# Patient Record
(demographics unavailable — no encounter records)

---

## 2018-06-23 NOTE — NUR
31Y/F CAME TO ER WITH  AND SON, C/O LOWER BACK PAIN/ABD. CRAMPING X1WK, 
DENIES PAINFUL URINATION, DENIES FALL. 18 WKS. GEST. , AB 1. SKIN IS 
PINK/WARM/DRY; AAOX4 WITH EVEN AND STEADY GAIT;  PATIENT STATES PAIN OF 5/10 AT 
THIS TIME; VSS; PATIENT POSITIONED FOR COMFORT; HOB ELEVATED; BEDRAILS UP X1; 
BED DOWN. ER MD MADE AWARE OF PT STATUS.

## 2018-09-28 NOTE — NUR
31/F BIB  FOR MED REFILL &  C/O L ARM WOUND & PAIN.  PT DC FROM [facility] 
YESTERDAY S/P SEPSIS & UTI. DENIES N/V/D;  AAOX4 WITH EVEN AND STEADY GAIT; 
LUNGS CLEAR BL. PT DENIES ANY FEVER, CP, SOB, OR COUGH AT THIS TIME; PATIENT 
STATES PAIN OF 10/10 AT THIS TIME. PATIENT POSITIONED FOR COMFORT; HOB 
ELEVATED; BEDRAILS UP X2; BED DOWN. ER MD MADE AWARE OF PT STATUS.

## 2018-09-28 NOTE — NUR
Patient discharged with v/s stable. Written and verbal after care instructions 
given and explained. 

Patient alert, oriented and verbalized understanding of instructions. 
Ambulatory with steady gait. All questions addressed prior to discharge. ID 
band removed. Patient advised to follow up with PMD. Rx of ASPIRIN, METOPROLOL, 
NORCO, AMLODIPINE, SERTYRALINE & AUGMENTINE given. Patient educated on 
indication of medication including possible reaction and side effects. 
Opportunity to ask questions provided and answered.

## 2018-09-30 NOTE — NUR
PT C/O SOB SINCE LAST NIGHT WITH ACCESSORY MUSCLE USE AND DIMINISHED BS; 
MULTIPLE ADMIT THE FOR THIS MONTH. PT STATES SHE JUST NEEDS OXYGEN. DIMINISHED 
LUNG SOUNDS; NO AIR FLOW AUSCULTATED. O2 VIA NASAL CANULA APPLIED. PATIENT 
STATES PAIN OF 0/10 AT THIS TIME; PATIENT POSITIONED FOR COMFORT; HOB ELEVATED; 
BEDRAILS UP X2; BED DOWN. ER MD MADE AWARE OF PT STATUS.

## 2018-09-30 NOTE — NUR
Patient appears in distress, assessment by RN completed. ED md made aware of pt 
status. ED md advised he will see her "when she is in the system."

## 2018-09-30 NOTE — NUR
CALLED TO ER FOR TX ON PATIENT WITH HISTORY OF ASTHMA. NO TX ORDERED YET. PATIENT ASSESSED: 
O2 SATS 100% ON 2L NASAL CANULA, RESPIRATIONS 28, PULSE 111. BREATH SOUNDS DIMINISHED ON 
BILATERAL ANTERIOR UPPER LOBES. PATIENT VERBALLY STATED SHE DOES NOT WANT TREATMENTS.

## 2018-09-30 NOTE — NUR
CHARLOTTE, RN PLACED PATIENT ON 3L NASAL CANNULA SINCE  AND SON AT BEDSIDE. PATIENT AWAKE 
ALERT AND ORIENTED. HR-88, SAO2-100% RR-20BPM. NO SIGNS OF RESPIRATORY DISTRESS

## 2018-09-30 NOTE — NUR
CALLED DR. CRAWFORD, CONFIRMED PT IS NPO STATUS EXCEPT MEDS DUE TO POSSIBILITY OF PANCREATITIS. 
WILL CARRY OUT.

## 2018-09-30 NOTE — NUR
called to icu 1 to place  pt on bipap due to respiratory distress, settings 12\6 rr 16 fio2 
30%, alarms on and audible ambu bag at side of bipap, b\s are coarse bilaterally,pt wearing 
large face mask with protetic gel in place. dr. juarez will order breathing tx and abg in 
one hour on the settings stated above.

## 2018-09-30 NOTE — NUR
PT TO CT SCAN OF CHEST ON BIPAP WITH SAME SETTINGS, THEN BACK TO ICU I STILL ON BIPAP PT 
SLEEPING DURING PROCEDURE  RN EIRENE AT BEDSIDE WITH PT

## 2018-09-30 NOTE — NUR
PATIENT IS COUGHING UP COPIOUS AMOUNTS OF BLOOD TINGED SPUTUM. PATIENT REPORTS 
IT STARTED LAST NIGHT.

## 2018-09-30 NOTE — NUR
RECEIVED BEDSIDE REPORT FROM MORNING SHIFT RN, FREDA, FOR CONTINUITY OF CARE. PT IS ASLEEP 
AT THIS TIME ON BIPAP.AFEBRILE (97.3), VSS, LUNG SOUNDS ARE CLEAR/DIMINISHED IN UPPER 
BILATERAL BASES, DIMINISHED ON LOWER BILATERAL BASES. SR ON CARDIAC MONITOR. TIMMONS IN 
INTACT, PATENT URINE IS CLEAR/YELLOW. BOWEL SOUNDS ACTIVE ON AUSCULATION. SKIN IS 
NON-INTACT, REDNESS AND STAPLES NOTED ON SITE OF , WOUND DRESSING DRY& INACT ON 
RIGHT UPPER ARM. NON-PITTING EDEMA ON BILATERAL FEET, PALM OF FEET DRY/ROUGH TO TOUCH.HOB 
ELEVATED ABOVE 30 DEG. RIGHT IJ TRIPLE LUMEN CENTRAL LINE IN PLACE, INFUSING HEPARIN AT 1100 
UNITS/HR AND NS AT 10ML/HR INFUSING.

## 2018-09-30 NOTE — NUR
PATIENT OFF UNIT TO GO TO RADIOLOGY FOR CT SCAN, PATIENT HOOKED UP TO TRANSPORT MONITOR AND 
BIPAP, VS STABLE AT THIS TIME, ACCOMPANIED BY 2 RTS. NO SIGNS OF DISTRESS NOTED. WILL 
CONTINUE TO MONITOR

## 2018-09-30 NOTE — NUR
PATIENT BACK IN UNIT, NO SIGNS DISTRESS DURING PROCEDURE, PATIENT TOLERATED WELL. PATIENT 
BACK ON CARDIAC MONITOR, BIPAP IN PLACE, VITAL SIGNS STABLE, NO SIGNS OF DISTRESS NOTED. 
WILL CONTINUE TO MONITOR

## 2018-09-30 NOTE — NUR
RECEIVED BEDSIDE REPORT FROM ER RN FOR CONTINUITY OF CARE. PATIENT IS AAOX4, ABLE TO FOLLOW 
COMMANDS AND MAKE NEEDS KNOWN. PATIENT IS ON NONREBREATHER MASK, BREATHING IS LABORED AND 
TACHYPNEIC, ST ON MONITOR, DENIES ANY PAIN. PATIENT SKIN IS NOT INTACT, SHE HAS WOUND TO 
RIGHT UPPER ARM AND LOWER ABDOMINAL S/P INCISION. PATIENT HAS TIMMONS CATHETER IN PLACE TO 
CLEAR YELLOW URINE. HOB IS 60 DEGREES IN LOW POSITION. ALL SAFETY PRECAUTIONS IN PLACE, CALL 
LIGHT WITHIN REACH. WILL CONTINUE TO MONITOR

## 2018-09-30 NOTE — NUR
RECEIVED CALL FROM DR. BARRIOS, UPDATED ON PATIENT'S CONDITION. RECEIVED ORDER FOR LASIX 20MG 
IVP NOW AND FOR TOMORROW MORNING

## 2018-09-30 NOTE — NUR
DR. ONTIVEROS IN TO SEE AND EXAMINE PATIENT, UPDATED ON PATIENT'S CONDITION. DISCUSSED PLAN OF 
CARE FOR PATIENT WITH DR. OSPINA, WILL FOLLOW UP ON ANY ORDERS.

## 2018-09-30 NOTE — NUR
Patient will be admitted to care of DR. WU. Admited to ICU.  Will go to room 
1. Belongings list completed.  Report to MANDY WEST.

## 2018-10-01 NOTE — NUR
ECHO TECH AT BEDSIDE FOR ECHOCARDIOGRAM, PATIENT TOLERATING WELL, NO SIGNS OF ACUTE DISTRESS 
NOTED.

## 2018-10-01 NOTE — NUR
PT. STILL SLEEPING BUT WOKE UP EASILY  FOR BREATHING TREATMENT AND LOVENOX SQ 
ADMINISTRATION. TOLERATED WELL. ABLE TO VERBALIZE NEEDS WELL IN Ghanaian. PT. ENCOURAGED TO 
CALL FOR ANY HELP SHE MAY NEED. BED ALARM ON.

## 2018-10-01 NOTE — NUR
PATIENT HAS BEEN SCREENED AND CATEGORIZED AS HIGH NUTRITION RISK. PATIENT WILL BE SEEN 
WITHIN 1-2 DAYS OF ADMISSION.



10/01/18



TONE ARENAS RD

## 2018-10-01 NOTE — NUR
RECEIVED FROM AM RN SITTING IN CHAIR WITH BREATHING TREATMENT ON GOING. ABLE TO VERBALIZE 
NEEDS WELL. NO SOB COMPLAINTS AT THIS TIME. PT. ABLE TO VERBALIZE NEEDS WELL. ROM X 4. CLEAR 
SPEECH. SPEAKS Trinidadian AND UNDERSTANDS A LITTLE BIT OF ENGLISH. TRANSFER FROM ICU WITH DX. 
OF PULMONARY EMBOLISM RT COMPLAINT OF SOB. CT SCAN  ABDOMEN SHOWS  NO ANEURYSM , ALL IMAGING 
TESTS DONE SHOWS NO EVIDENCE OF THROMBUS. TELEMETRY MONITORING.

## 2018-10-01 NOTE — NUR
ADMINISTERED PRN NORCO PO FOR 6/10 PAIN TO HER RIGHT UPPER ARM. PATIENT TOLERATED WELL, WILL 
REASSESS PAIN IN 45 MINS.

## 2018-10-01 NOTE — NUR
PT TRANSFERRED FROM ICU. PT IS AAOX4, AMBULATORY, PT HAS IJ X 3 LUMEN, PT HAS RIGHT UPPER 
ARM WOUND S/P I & D,  STITCHES, PT IS ON O2 3L NC, NO S/S OF RESPIRATORY DISTRESS 
OR DISCOMFORT NOTED, DISCUSSED PLAN OF CARE WITH PT, PT VERBALIZED UNDERSTANDING, ORIENTED 
PT TO ROOM, CALL LIGHT IS WITHIN REACH, WILL CONTINUE TO MONITOR.

## 2018-10-01 NOTE — NUR
WOUND CARE RN, HUMBLE, AT BEDSIDE TO EVALUATE WOUND AND CHANGE DRESSING. PATIENT TOLERATES 
WELL, STATES THAT THERE IS NO PAIN, BUT IT IS ITCHY. PATIENT WAS EDUCATED ON WOUND CARE FOR 
HER RIGHT UPPER ARM, PATIENT VERBALIZES UNDERSTANDING. PATIENT IS ALSO AWARE THAT THE 
SURGEON WILL CHECK ON THE ABDOMINAL INCISION TO REMOVE STAPLES.

## 2018-10-01 NOTE — NUR
TRANSFERRED PATIENT TO [facility], PATIENT WAS HOOKED TO TRANSPORT MONITOR, VITAL SIGNS STABLE, 
NASAL CANNULA AT 3LPM, NO SIGNS OF SOB NOTED. ENDORSED CONTINUITY OF CARE TO [facility] RN. NO 
SIGNS OF DISTRESS AT THIS TIME.

## 2018-10-01 NOTE — NUR
PATIENT BACK TO UNIT. PATIENT TOLERATED CT SCAN WELL, NO ACUTE DISTRESS NOTED. PATIENT IS 
BACK ON CARDIAC MONITOR, VITAL SIGNS STABLE.

## 2018-10-01 NOTE — NUR
MD FONTANEZ IN HERE TO CHECK ON PT'S . RIGHT WOUND TO AC . DRESSING CHANGED . STILL WITH 
SLIGHT SANGUINOUS DISCHARGE FROM SITE NOTED. PT. SPOUSE AND TODDLER SON IN HERE VISITING . 
ADVISED FATHER TO KEEP AN EYE ON SON AND NOT TO LET HIM GO ON TOP OF BED RT POSSIBLE INJURY 
OR FALL. "OK" PER SPOUSE AND PT. CARE PLANS FOR THE NIGHT DISCUSSED WITH PT. AND CALL LIGHT 
WITH IN REACH AT ALL TIMES. PT. USES CALL LIGHT FOR HELP AT ALL TIMES. A/O X 4. ROM X 4. PT. 
ABLE TO BEND RIGHT HAND FROM ELBOW WELL AS REQUESTED BY MD. FONTANEZ.

## 2018-10-01 NOTE — NUR
PT IS AWAKE AND ALERT, ON 3L NASAL CANNULA SATING %. RT AT BEDSIDE TO PROVIDE 
BREATHING TREATMENT. BED BATH, ORAL CARE, AND CATHETER CARE PROVIDED AT BEDSIDE. URINE 
OUTPUT  Q SHIFT. FRESH GOWN PROVIDED. 

-------------------------------------------------------------------------------

Addendum: 10/01/18 at 0701 by Yessi Waller RN

-------------------------------------------------------------------------------

URINE OUTPUT 500ML Q SHIFT

## 2018-10-01 NOTE — NUR
PATIENT WANTED OFF BIPAP AND PLACED ON NASAL CANNULA 2LPM BY MANDY COOK. NO DISTRESS NOTED 
SAO2-98% RR-18 HR-95BPM

## 2018-10-01 NOTE — NUR
DR. MYERS IN TO SEE AND EXAMINE PATIENT, UPDATED ON PATIENT'S CONDITION. WILL FOLLOW UP ON 
ANY ORDERS.

## 2018-10-01 NOTE — NUR
10/1/18 RD INITIAL ASSESSMENT COMPLETED



PLEASE REFER TO NUTRITION ASSESSMENT UNDER CARE ACTIVITY FOR ESTIMATED NUTRITIONAL NEEDS. 



1. CONTINUE CARDIAC DIET AS TOLERATED 

2. RECOMMENDED NADIYA BID

3. RD TO FOLLOW-UP 5-7 DAYS, LOW RISK 



TONE ARENAS RD

## 2018-10-01 NOTE — NUR
PT. FAMILY MEMBER LEFT FOR HOME. SEEN PT. WALK INSIDE ROOM WELL WITH OUT ANY ASSIST. ROM X 
4. CLEAR SPEECH. CALL LIGHT WITH IN REACH.

## 2018-10-01 NOTE — NUR
PATIENT'S O2 SATURATION 97% ON RA. LEFT CANULA ON 1LPM AND WITHIN REACH BUT OFF PATIENT. 
WILL CONTINUE TO MONITOR.

## 2018-10-01 NOTE — NUR
RECEIVED BEDSIDE REPORT FOR CONTINUITY OF CARE FROM NIGHT SHIFT RNRENEE. PATIENT IS AAOX4, 
ABLE TO FOLLOW COMMANDS AND MAKE NEEDS KNOWN. SKIN NOT INTACT, WARM AND DRY, HAS WOUND TO 
RIGHT UPPER ARM, DRESSING CLEAN AND DRY. PATIENT HAS S/P INCISION POST CESARIAN, NO DRAINAGE 
NOTED. PATIENT HAS CENTRAL LINE TO RIGHT IJ, ASYMPTOMATIC, INTACT, PATENT, DRESSING CLEAN 
AND DRY. PATIENT IS ON NASAL CANNULA AT 3LPM, BREATHING UNLABORED AND EVEN, ST ON CARDIAC 
MONITOR, DENIES ANY PAIN, SOB. PATIENT HAS TIMMONS CATHETER IN PLACE TO YELLOW URINE. HOB IS 
IN SEMI-FOWLERS POSITION, CALL LIGHT WITHIN REACH. ALL SAFETY PRECAUTIONS ASSESSED AND 
ENFORCED. NO SIGNS OF DISTRESS NOTED AT THIS TIME. WILL CONTINUE TO MONITOR.

## 2018-10-01 NOTE — NUR
PATIENT DENIES ANY PAIN OR SOB AT THIS TIME, PATIENT IS SPEAKING WITH  AT BEDSIDE, NO 
SIGNS OF DISTRESS NOTED. WILL CONTINUE TO MONITOR

## 2018-10-01 NOTE — NUR
PATIENT IS OFF UNIT FOR CT SCAN, HOOKED UP TO CARDIAC MONITOR, ON NASAL CANNULA AT 3LPM, 
DENIES ANY SOB. VITAL SIGNS STABLE, NO SIGNS OF DISTRESS NOTED. ACCOMPANIED BY RADIOLOGY 
TECH

## 2018-10-02 NOTE — NUR
ADMINISTERED MORNING MEDS. PT TOLERATED WELL. PER PT, VERY SLEEPY. WENT RIGHT BACK TO SLEEP. 
WILL CONTINUE TO MONITOR PT.

## 2018-10-02 NOTE — NUR
SLEPT WELL THIS SHIFT. NO COMPLAINTS DONE AT THIS TIME. CALL LIGHT WITH IN REACH AT ALL 
TIMES. NO SOB. ROOM AIR SINCE LAST NIGHT. KEEPS TAKING OUT NASAL CANNULA. 02 SAT WITH IN 
NORMAL LIMITS.

## 2018-10-02 NOTE — NUR
PATIENT REFUSED BIPAP TO MASK ON SUPPLEMENTAL OXYGEN AT 2 LPM VIA NC NO EVIDENCE OF 
PULMONARY DISTRESS NOTED AT THIS TIME

## 2018-10-02 NOTE — NUR
ADMINISTERED SCHEDULED MED AND A NORCO FOR PAIN. REMOVED OLD DRESSING ON R UA AND TOOK 
PICTURES. REDRESSED WITH THERA HONEY SHEETS AND GAUZE AND WRAPPED WITH AZAEL WRAP. PT 
TOLERATED WELL.

## 2018-10-02 NOTE — NUR
PT. PREPARING TO BE DISCHARGED.  IN HERE TO  PT. AWAKE AND ALERT. GOOD AFFECT. 
SMILING.  PT. STILL EATING DINNER. ALL  DISCHARGE PAPER WORKS PRINTED AND SIGNED.

## 2018-10-02 NOTE — NUR
RCV'D PT ON 2 L NC SITH SPO2 OF 97% CLEAR BREATH SOUNDS BILATERALLY. NO SOB OR DISTRESS 
NOTED. BIPAP IS STANDBY AT BEDSIDE. HHN TX OF DUONEB AND PULMICORT GIVEN WITH NO ADVERSE 
REACTION. PT IS ASLEEP COMFORTABLY. WILL CONTINUE TO MONITOR.

## 2018-10-02 NOTE — NUR
Note:



Late entry for 10/1/18: I met with patient at bedside. Patient was recently admitted and 
discharged from [facility]. patient speaks Yakut, does not speak 
English. She stated she does not have any questions or concerns at this time. Patient does 
not have a primary care physician at this time and states she does not have funds to pay for 
medical appointments co-pays. On previous hospital admission I referred patient to 
[facility] [phone], [facility] is a program designed to 
follow individual/patient minimum of 30 days post-hospitalization with home visits and phone 
support as needed. A Licensed Nurse or  will customize a home visit plan of 
patient needs and community based resources are provided regardless of insurance coverage or 
ability to pay. Per Saba Sesay [phone] from [facility], one of their 
Social Workers will help patient find a low cost or free medical clinic. Their  
will also assist patient with locating a low cost or free counseling center. Saba stated 
they will follow up with patient at home, she is aware patient will be returning home today. 
Patients home address is [address] [place] [address]. [address] [address].

## 2018-10-02 NOTE — NUR
ENDORSED D/C TO THE NIGHT SHIFT NURSE AT BEDSIDE FOR CONTINUITY OF CARE. PT IS IN STABLE 
CONDITION.

## 2018-10-02 NOTE — NUR
PT VISITING WITH FRIENDS. NO SIGNS OF RESPIRATORY DISTRESS. NO COMPLAINTS. WILL CONTINUE TO 
MONITOR PT.

## 2018-10-02 NOTE — NUR
PT. WHEELED TO THE FRONT BY CNA AND ACCOMPANIED BY SPOUSE. AWAKE AND ALERT. GOOD AFFECT. 
SMILING AND HAPPY TO GO HOME. CENTRAL LINE TO RIJ DISCONTINUED BY CHARGE NURSE CORETTA  WITH 
DARK BLUE TIP INTACT. COVERED WITH OPSITE DRESSING. NO BLEEDING FROM SITE. ALL BELONGINGS 
ACCOUNTED FOR AND SIGNED. RESIDENT MD MIN TALKED WITH PT.  PRIOR DISCHARGE AND REMINDED 
HER OF APPOINTMENTS AND ANSWERED ALL HER QUESTIONS. PT. SATISFIED WITH ANSWERS FROM RESIDENT 
MD. NO COMPLAINTS DONE . NO PAIN COMPLAINTS. DISCHARGE PAPER WORKS SIGNED AND EXPLAINED . 
DISCHARGE EDUCATION EXPLAINED WELL BY RESIDENT MD AND AM RN. NAME BAND TAKEN OUT .

## 2018-10-02 NOTE — NUR
RECEIVED REPORT FROM THE NIGHT SHIFT NURSE AT BEDSIDE FOR CONTINUITY OF CARE. PT IS AWAKE 
AND ORIENTED. INTRODUCED MYSELF AND UPDATED THE BOARD.  NC O2 3L IN PLACE. R IJ IN PLACE. R 
UA DRESSING IN PLACE. DRY AND INTACT. ABD STAPLES IN PLACE. NO SIGNS OF INFECTION. DRY AND 
INTACT. V/S WITHIN NORMAL RANGE. DENIES PAIN AT THIS TIME. NO SIGNS OF DISTRESS. WILL 
CONTINUE TO MONITOR PT.

## 2018-10-04 NOTE — NUR
RECEIVED PT FROM ICU NURSE, GERMÁN,VIA [facility].  PT IS AWAKE AND AMBULATED TO THE BED FROM THE 
[place], NO SOB NOTED. PT HAS AN IV LINE ON THE LEFT AC G. 20 INTACT. PT HAS AN O2 AT 2L VIA 
NC PLACED. A ABDOMINAL DRESSING WAS NOTED ON THE RT ARM OF THE PT. PT DENIES PAIN AT THIS 
TIME AND WILL CONTINUE TO MONITOR.

## 2018-10-04 NOTE — NUR
DR. MYERS IN TO CHECK PT, UPDATED PT'S CONDITION. PER CHANGE STOP HEPARIN DRIP AFTER 
GIVING THE FIRST DOSE OF XARELTO, WILL CARRY OUT.

## 2018-10-04 NOTE — NUR
WOUND PICTURES TAKEN. WOUND CARE PROVIDED. COVERED WITH DRESSING. NO ACTIVE 
DRAINAGE AT THE SITE.  INTACT DRESSING.

## 2018-10-04 NOTE — NUR
PATIENT PRESENTS TO ED WITH THE CHIEF C/O CONSTANT CHEST PAIN FOR 2 WEEKS. PT 
STATED SHE WAS CODED HERE 2 WEEKS AGO, WAS PREGNANT BABY . DENIES V/D. BUT 
STATED NAUSEA AT THIS TIME. SKIN IS PINK/WARM/DRY; AAOX4 WITH EVEN AND STEADY 
GAIT; LUNGS CLEAR BL; HR EVEN AND REGULAR; PT DENIES ANY FEVER, OR COUGH AT 
THIS TIME. HAS LABORED BREATHING. RR 25 SPO2 95% IN ROOM AIR.AFEBRILE. PATIENT 
STATES HEADACHE AND CHEST PAIN OF 7/10 AT THIS TIME; VSS; PATIENT POSITIONED 
FOR COMFORT; HOB ELEVATED; BEDRAILS UP X2; BED DOWN. ER MD MADE AWARE OF PT 
STATUS.

## 2018-10-04 NOTE — NUR
RECEIVED PT FROM JULI RN PT AAOX4 ON 02 2 LTS VIA NC  LABORED BREATHING AND SHALLOW RESP 
ON TELEMETRY ST  RIGHT UPPER ARM  DRESSING DRY AND INTACT IV ON LEFT AC INFUSING WELL,  
INITIAL ASSESSMENT DONE

## 2018-10-04 NOTE — NUR
RT ARM DEEP TISSUE OPEN WOUND WAS CLEANED AND REINFORCED BY AN ADAPTIC DRESSING, HYDROGEL 
APPLIED AND SECURED BY AN ABDOMINAL DRESSING. DRESSING WAS TIMED, INITIALED AND DATED. PHOTO 
OF THE WOUND WAS TALKEN AND ATTACHED TO THE CHART.

## 2018-10-04 NOTE — NUR
PT BACK FROM CT. PUT ON BEDSIDE MONITOR. DENIES CHEST PAIN, DIFFICULTY 
BREATHING AT THIS TIME. PT DRANK A CUP OF WATER. LYING ON BED CONMFORTABLY.  VS 
WNL.

## 2018-10-04 NOTE — NUR
JASMYN TOMAS  #[id number] USED TO ASK QUESTIONS REGARDING FLU, PNA 
VACCINATIONS, PREVIOUS BLOOD TRANSFUSION AND [facility].

## 2018-10-04 NOTE — NUR
STOPPED HEPARIN DRIP, XARELTO GIVEN. PT TOLERATED WELL.MAGNESIUM SULFATE 1 GM IN D5 100 MLS 
PREMIXED HANGED. CHARGE NURSE MADE AWARE.

## 2018-10-04 NOTE — NUR
ADMITTED PT FROM ER BY KIYA. PT AWAKE, ALERT. Ivorian SPEAKING. ABLE TO FOLLOW COMMANDS. 
BEDSIDE MONITOR SHOWS SR 95S. PT ON O2 NC 2L/MIN, NO S/S OF RESPIRATORY DISTRESS NOTED.PT 
STATED CHEST PAIN RELIVED.IV SITE TO LEFT AC # 20 ON HEPARIN DRIP 1150 UNITS/HR AND 0.9 NS 
 MLS/HR.SITE INTACT AND PATENT. PT ABLE TO AMBULATE. PT HAS WOUND TO RIGHT ARM ( SEE 
WOUND ASSESSMENT), CALL LIGHT IN REACH, ORIENTED PATIENT ENVIRONMENT, POC EXPLAINED TO PT, 
PT VERBALIZED UNDERSTANDING, HOB ELEVATED 30 DEGREES WITH LOW BED POSITION. WILL CONTINUE TO 
MONITOR.

## 2018-10-04 NOTE — NUR
PT STATED SHE IS NOT READY TO VOID TO COLLECT URINE. DOESNOT WANT  STRAIGHT 
CATH FOR SAMPLE COLLECTION. WILL FOLLOW UP.

## 2018-10-04 NOTE — NUR
Patient admitted the care of LETTY Laughlin. Transferred to ICU room 1 via gurney on 
stable condition.  made aware. Belongings list completed. Report given 
to MANDY Navarro. Sent belongings with patient.

## 2018-10-04 NOTE — NUR
spoke with Timothy, House Supervisor, awaiting on call nurse to call or text 
back in order to admit patient

## 2018-10-04 NOTE — NUR
DR STEVEN M GOLDBERG REVIEWED ABG SAMPLE REPORT NEW ORDER: SUPPLEMENTAL OXYGEN 
AT 2 LPM VIA NC IMANI/RN NOTIFIED

## 2018-10-05 NOTE — NUR
OFFERED PT THE FLU VACCINATION. PT REFUSED. WILL GET IT AT PCP OFFICE. DOESN'T FEEL LIKE IT 
TODAY. RECOMMENDED THAT SHE GET IT DURING THIS FLU SEASON.

## 2018-10-05 NOTE — NUR
V/S WITHIN NORMAL RANGE. NOT WEARING NC, SATURATING AT 99%. NO COMPLAINTS OF PAIN. NO RESP 
DISTRESS. WILL BE BACK W/ MORNING MEDS.

## 2018-10-05 NOTE — NUR
PATIENT HAS BEEN SCREENED AND CATEGORIZED AS MODERATE NUTRITION RISK. PATIENT WILL BE SEEN 
WITHIN 3-5 DAYS OF ADMISSION.



10/06/18  10/08/18



TONE ARENAS RD

## 2018-10-05 NOTE — NUR
D/C INSTRUCTIONS GIVEN TO PT. WENT OVER EACH OF HER APPTS. EMPHASIZED TO PT AND SPOUSE THAT 
SHE IS TO COME IN TO OUR ER MWF FOR DRESSING CHANGES WITH DORITA KATE RN. WROTE IT ALL DOWN IN 
Armenian ON A SEPARATE PIECE OF PAPER. SPOUSE AND PT VERBALIZED UNDERSTANDING. ANSWERED ALL 
QUESTIONS. REMOVED IV, CANNULA INTACT. NO BLEEDING NOTED. REMOVED ID BANDS. REMOVED TELE 
MONITOR. PT WILL GET DRESSED AND GATHER ALL HER PERSONAL BELONGINGS. SHEN ERIC IS AT 
BEDSIDE TO ASSIST. WILL WHEELCHAIR PT OUT.

## 2018-10-05 NOTE — NUR
WHEELCHAIRED PT OUT TO THE CAR. SPOUSE AND SON WITH HER. ALL PERSONAL BELONGINGS WITH HER. 
PT IN STABLE CONDITION.

## 2018-10-05 NOTE — NUR
PT RESTING ON BED NOT DISTRESS NOTED  T  WILL BE ENDORSED TO DAY SHIFT NURSE FOR CONTINUITY 
OF CARE

## 2018-10-05 NOTE — NUR
WOUND CARE EVALUATION NOTE:

PT. IS AAX4, RE-ADMITTED TO [facility] FOR DX OF PE. PT. REQUEST TO SEE SOCIAL SERVICE, CASE 
MANAGEMENT DIRECTOR MADE AWARE. SKIN ASSESSMENT DONE WITH NO NEW SKIN BREAKS NOTICE SINCE 
LAST DISCHARGED, RIGHT UPPER ARM WOUND DRESSING CHANGE WITH ASSESSMENT DONE.

PER DR. PIERRE PT. WILL BE ARRANGED TO [facility] FOR SKIN EL TO RIGHT UPPER ARM IN 
ABOUT 2 WEEKS.



INTEGUMENTARY:

-SURGICAL WOUND RIGHT UPPER ARM, 57Q00C8.2CM WOUND BED IS MOIST, WOUND BED COLOR MIXED OF 
YELLOW AND BROWN TISSUE, 30% RED GRANULATION TISSUE OBSERVED, NO DRAINAGE, NO ODOR, WITH 
IRREGULAR WOUND SHAPE. MONTSE-WOUND SKIN INTACT, NO REDNESS. PAIN 0/10



RECOMMENDATIONS:

-CLEANSE RIGHT UPPER ARM SURGICAL WOUND WITH NS. PAT DRY, APPLY THERAHONEY DRESSING SHEET 
AND COVER WITH DRY DRESSING AND KERLIX ROLLS, SECURE WITH TAPE, CHANGE EVERY OTHER DAY AND 
PRN IF SOILING





RECOMMENDATIONS DISCUSSED WITH PRIMARY RN 

WILL FOLLOW UP PATIENT Q 7 -10 DAYS AND PRN.  PLEASE CONTACT WOUND CARE NURSE FOR ANY 
CONCERNS AND CHANGES IN WOUND CONDITION

## 2018-10-05 NOTE — NUR
RECEIVED REPORT FROM THE NIGHT SHIFT NURSE AT BEDSIDE FOR CONTINUITY OF CARE. PT IS AWAKE 
AND ORIENTED. R/T HERE IS, PT GETTING BREATHING TX. INTRODUCED MYSELF AND UPDATED THE BOARD. 
PT IS HERE FOR SOB, PE. PT IS AMBULATORY. ABD INCISION W/ STAPLES. R UA DRESSING INTACT. IV 
ON L AC 20G NS AT 50ML INFUSING. NO SIGNS OF DISTRESS. NO COMPLAINTS AT THIS TIME. WILL 
CONTINUE TO MONITOR PT.

## 2018-10-05 NOTE — NUR
Note:



I met with patient and patient's  Zander at bedside. Patient was recently admitted and 
discharged from [facility]. patient speaks Citizen of the [place], does not speak 
English. On a recent hospital admission I referred patient to [facility] 
[phone], [facility] is a program designed to follow individual/patient 
minimum of 30 days post-hospitalization with home visits and phone support as needed. A 
Licensed Nurse or  will customize a home visit plan of patient needs and 
community based resources are provided regardless of insurance coverage or ability to pay. 
Patient does not have a primary care physician at this time and states she does not have 
funds to pay for medical appointments co-pays. 

One of their Social Workers from [facility] will help patient find a low cost 
or free medical clinic. Their  will also assist patient with locating a low 
cost or free counseling center. Patients home address is [address] [place] [address]. I provided patient and Zander with the phone number of [facility]. I emphasized to patient and Zander the importance of patient following up with 
instructions regarding coming to Emergency Room for wound care. They verbalized 
understanding. 



Per patient, she would like to obtain her a copy of her medical records. I provided patient 
and Zander with instructions on how to obtain a copy of patients medical records from our 
Medical Records Department.

## 2018-10-05 NOTE — NUR
ADMINISTERED MORNING MEDS. PT TOLERATED WELL. NO SIGNS OF DISTRESS. BREAKFAST ALL DONE. ATE 
100%. NO SIGNS OF RESP DISTRESS. PER PT " EVERYTHING OK". WILL CONTINUE TO MONITOR PT.

## 2018-10-05 NOTE — NUR
REMOVED TOTAL OF 17 STAPLES. SOME WERE ALREADY GONE. MILD ORDER FROM NOT SHOWERING. CLEANED 
AREA W/ BETADINE AND LEFT AMAURY. EDUCATED PT ABOUT HYGIENE AND IMPORTANCE OF KEEPING IT CLEAN 
AND DRY. PT TOLERATED WELL. WILL CONTINUE TO MONITOR PT.

## 2018-10-05 NOTE — NUR
DR MYERS IS HERE TO ASSESS PT. PT GOOD TO GO HOME WITH RX OF XARELTO. WILL NEED TO F/U WITH 
DR MYERS IN THE OFFICE, WILL REPEAT CT IN 3 MO TO MAKE SURE PE IS GONE. WILL AWAIT D/C 
INSTRUCTIONS.

## 2018-11-02 NOTE — NUR
PT PRESENTS TO ED WITH C/O PALPITATIONS X 30MINS. PT DENIES CP, SOB. PT DENIES 
MEDICAL HX. EKG PERFORMED IN TRIAGE-[facility] AT 80 BPM. PT PLACED INTO BED, PENDING 
MD GARCIA.

## 2020-01-01 NOTE — NUR
ENDORSED CONTINUITY OF CARE TO RENEE NIGHT SHIFT RN. PATIENT IS RESTING COMFORTABLY, NO 
SIGNS OF DISTRESS NOTED. 0

## 2020-10-23 NOTE — NUR
AT BEDSIDE, UPDATED ON PATIENT'S CONDITION. WILL FOLLOW UP ON ANY ORDERS.
 Note:



Director of Case Management and  Dept Sera and I met with patient and 
patient's  Zander [phone] at bedside to discuss fetus' mortuary and/or  
arrangements. Patient and Zander speak Qatari, not fluent in English. I asked patient and 
Zander if they had made arrangements using the resources provided to them by GANESH Palacios and 
myself. They stated they had not and reported they did not have funds for burial or 
cremation. I emphasized the importance of them contacting mortuary/ companies and 
explaining to the staff their financial situation and asking staff which options were able 
to them. I also explained to them our hospital does not have a morgue, therefore, it is 
crucial to make burial or cremation arrangements as soon as possible in order for fetus to 
receive proper handling care. I explained to them they had the option of releasing fetus to 
hospital and allowing hospital to follow fetus disposition policy. They stated they did not 
want hospital to be responsible for fetus disposition. Zander stated he was going to contact 
[facility] [phone] today. I offered to contact [facility] [phone] on his behalf ([facility] has offered to assist low income 
individuals and families with payment plans and/or fundraise funds in the past). He agreed. 
I called and spoke with Aidee from [facility], she stated she was going to 
contact Zander today (Aidee speaks Qatari).
 Note:



I met with patient at bedside. Patient speaks Canadian, does not speak English. Per patient, 
she does not have a primary care physician at this time and does not have funds to pay for 
medical appointments co-pays. I offered to refer her to [facility] 
[phone], [facility] is a program designed to follow individual/patient 
minimum of 30 days post-hospitalization with home visits and phone support as needed. A 
Licensed Nurse or  will customize a home visit plan of patient needs and 
community based resources are provided regardless of insurance coverage or ability to pay. 
Patient agreed for me to refer her to [facility] and provided me with her home 
address [address] [place] [address]. [place] [address]. 



I called and spoke with Saba Sesay [phone] from [facility]. Per 
Saba, they serve the [facility] and are able to assist patient with finding a low cost 
or no cost medical clinic, assist patient and  with finding a low cost or no cost 
mental health provider, and provide patient with education on how to change wound dressings 
and properly care for wound. Saba stated their nurses are not able to physically provide 
wound care but are able to provide education on wound care. Saba stated they have staff 
that speak Canadian. Saba requested I fax patient's face sheet, list of medication, and HNP 
to her, fax [phone]. I faxed info to Saba. I received a call from Saba, per Saba, 
they will follow up with patient at home.
 Note:



I received a call from Marcell at  [facility] [phone], she stated they picked 
up fetus last night. Marcell told me patients  Zander [phone] was supposed to go 
to their office and sign documents this morning at 9am. However, Zander did not go to their 
office. Marcell stated she has been calling Zander, however, his cell phone is currently out of 
service. Per Marcell, they decided to accept this case as a gabrielle case and are charging 
patient and/or Zander $50 in total. I told her I will relay information to patient and/or 
Zander once I met with them. I went to patients room and met with patient and Zander at 
bedside. I explained to them it is important for them to go to the office of [facility] and sign documents. They verbalized understanding and Zander stated he was going to 
contact Marcell from [facility] this morning.



I called and spoke with Samaria from [facility] [phone], told 
her we no longer need their services and thanked her for her assistance.
 Note:



Late entry for 9/19/18:  and I met with patient's  Zander Bhatti.  
explained to Zander patient is currently in ICU, intubated, and in stable condition, I 
relayed this information to Zander in [place] Islander. Zander is not fluent in English. Per Zander, he 
has contacted patient's family and waiting for them to arrive to hospital. Both Meg and I 
gave Zander our condolences and explained to him to please not hesitate to let us know if you 
could provide any support to him and patient's family. I provided him with my contact 
information.  will continue to follow up as needed.
 Note:



Late entry for 9/24/18: I met with patient at bedside. Patient speaks Citizen of [place], not fluent in 
English. I provided her with education on local counseling/mental health services and 
provided her with a list of local counseling mental health services, list includes agencies 
that offer services in Citizen of [place].
 Note:



Patients  Zander [phone] came to Case Management and  Dept and 
asked for assistance with directions to [facility] [phone]. I printed and 
provided Zander with directions from [facility] to [facility].
 Note:



Per patient's  Zander, he would like to speak with  from hospital, he 
didn't stated reason, GANESH Palacios will meet with Zander this afternoon. 

-------------------------------------------------------------------------------

Addendum: 09/23/18 at 1033 by Elizabeth Reyes SS

-------------------------------------------------------------------------------

correction for above note: he didn't state reason, GANESH Shannona will meet with Zander this 
afternoon.
 Notes: I met with Patient and  at bed side to discuss concerns,  Per 
patient's  Davy, he wanted to  speak with  from hospital, to request 
for resources or information regarding Mortuary services at low cost.  Due to their resent 
loss of a child and been limited with money.  I provided a mortuary list, and discuss with 
Patient's  about inquiring for low cost services or payment plans.  He agreed and 
stated that he will contact them to see if there was any resources available for them.  He 
thanked me for the information.
 SPEAKING TO PATIENTS  VIA  PHONE#[phone], TO OBTAIN CONSENT FOR 
INSERTION OF STANISLAV CATHETER.
*** SLP note (dysphagia therapy and discharge summary report) ***



12:00-12:30.  Pt was provided with dysphagia therapy following clearance by RN (Joshua).  
Pt sitting up in chair beside her bed with RN present for IV care.  Pt c/o her right arm 
feeling numb and swollen.  RN explained that it is due to pt's IV line had gotten 
infiltrated in that arm.  Pt assisted with opening her lunch tray containers and then pt 
self-fed herself lunch without difficulty with chewing/swallowing.  Pt's spouse 
intermittently at bedside.  One of pt's children (3 year old son) present at bedside as 
well.



Pt was provided with supportive listening, encouragement and education regarding purpose of 
SLP's visit with good result.  Pt wanted SLP to look at the photos of her baby that she lost 
during this hospitalization.  SLP offered pt condolences and sympathy.



Pt prefers to remain on mechanical soft textures at this time per pt report when asked by 
SLP.





Discharge Summary Report

Pt was provided with bedside swallow evaluation on 9/22/2018 with recommendations for 
mechanical soft textures and thin liquids.

Pt was provided with dysphagia therapy on 9/24/2018 with same recommendations.



Recommend:

1) continue mechanical soft textures with thin liquids until pt ready to advance to regular 
textures again

2) continue general aspiration precautions

3) discharge pt from SLP intervention at this time.  Physician may reorder if further 
concerns arise, as appropriate.



G-codes:

-XV

-BT

-WR

ROWDY NOMS level 6



SLP d/w RN Dre) prior to and following dysphagia therapy session.
*** SLP note ***



Order received for bedside swallow evaluation, chart reviewed.  Pt was intubated 9/19/2018 
and extubated 9/21/2018 at 08:30am.



Per SLP protocol, SLP will defer bedside swallow evaluation completion until minimum of 24 
hours s/p extubation has elapsed to decreased pt's risk for silent aspiration (which is 
greatest during first 24 hours s/p extubation).



SLP to f/u after at least 24 hours s/p extubation have elapsed, as appropriate.
***** PHYSICAL THERAPY CO-SIGN *****

The Physical Therapy Progress Notes documented by Physical Therapy Assistant have been 
reviewed.



**** I concur with the documentation of this PTA. Plan: continue PT as per plan of care as 
she is making good and steady progress with P.T. services.



Reviewed/Co-Signed by:  Teresa Alfonso DPT

Documentation Done by:  Caden Flowers PTA

-------------------------------------------------------------------------------

Addendum: 09/26/18 at 1429 by Teresa Alfonso PT

-------------------------------------------------------------------------------

Amended: Links added.
***** PHYSICAL THERAPY CO-SIGN *****

The Physical Therapy Progress Notes documented by Physical Therapy Assistant have been 
reviewed.



**** I concur with the documentation of this PTA. Plan: continue PT as per plan of care if 
she remains in this hospital.



Reviewed/Co-Signed by:  Teresa Alfonso DPT

Documentation Done by:  Caden Flowers, PTA

-------------------------------------------------------------------------------

Addendum: 09/25/18 at 1238 by Teresa Alfonso PT

-------------------------------------------------------------------------------

Amended: Links added.
**SLP NOTE**

[id number]



Bedside swallow evaluation completed following clearance by MANDY Marquez. Please refer to SLP 
document for full report. 

Recommend: 

-Mech soft texture + thin liquids for all PO intake. Whole, oral meds ok. 

-1:1 feeder assistance for safety

-Upright at 90 during and 20 min after intake

-Small bites/sips, slow rate, alternate bites/sips, straw ok. 

-SLP to follow 2x/wk x1wk for diet toleration and advancement as appropriate. 



Education provided to pt and RN re: results and recommendations. 



: CJ

: CI

Swallow NOMS 4
/119 MMHG AT THIS TIME, DR PIERRE MADE AWARE.  WILL FOLLOW UP WITH ORDERS.
09/23/18 RD FOLLOW UP COMPLETED



PLEASE REFER TO NUTRITION PROGRESS NOTE UNDER CARE ACTIVITY FOR ESTIMATED NUTRITION NEEDS. 



RD RECOMMENDATIONS:



1. RECOMMEND CONTINUE CURRENT DIET 2G NA MECHANICAL SOFT; SUFFICIENT TO MEET PT DAILY 
ESTIMATED NEEDS.



2. WHEN PT FEELS BETTER AND MORE ENERGETIC/READY, EDUCATE PATIENT ON DIET.



3. RD TO FOLLOW-UP 2-3 DAYS, HIGH RISK



ALEXIS LUNA MBA, RD
2 OB RNS AT BEDSIDE TO ASSESS PATIENT, PER OB RN PATIENT BLEEDING IS WITHIN NORMAL LIMITS, 
STATES THEY WILL CONTINUE TO ASSESS Q4H. NO SIGNS OF DISTRESS AT THIS TIME, RASS IS -3. ALL 
SAFETY PRECAUTIONS ENFORCED, WILL CONTINUE TO MONITOR.
2130 PHONE CALL MADE TO DR CRAWFORD RE; TROPONIN [phone]; MD SAID TO CALL CARDIOLOGIST

PHONE CALL MADE TO DR BENÍTEZ; MADE AWARE OF THE ABOVE,DR TAYLOR WAS MADE AWARE THAT THIS 
IS LINE DRAW AND THAT ANOTHER LAB DRAW WAS DONE PERIPHERALLY;NO NEW ORDER
9/20/18 RD INITIAL ASSESSMENT COMPLETED



PLEASE REFER TO NUTRITION ASSESSMENT UNDER CARE ACTIVITY FOR ESTIMATED NUTRITIONAL NEEDS. 



1. WHEN PATIENT IS MEDICALLY STABLE CONSIDER ADVANCING TO TUBE FEED VIA NG-TUBE WITH VITAL 
AF 1.2 AT GOAL RATE 35 ML/HR.  

2. RECOMMEND FREE WATER FLUSH 55 ML Q4H

3. RD TO FOLLOW-UP 2-3 DAYS, HIGH RISK 



OTNE ARENAS, RD
9/26/18 RD FOLLOW UP COMPLETED



PLEASE REFER TO NUTRITION ASSESSMENT UNDER CARE ACTIVITY FOR ESTIMATED NUTRITIONAL NEEDS. 



1. CONTINUE NA2GM DIET AS TOLERATED 

2. PROVIDED EDUCATION ON HEART HEALTHY DIET

3. RD TO FOLLOW-UP 3-5 DAYS, MODERATE RISK 



TONE ARENAS, RD
ABG REVIEWED BY DR MOSES GORMAN VENTILATOR CHANGES BY MD RATE 22  PEEP 6 FIO2 60% 
ABG AFTER 1 HOUR
ABG SAMPLE REPORT REVIEWED BY DR TITA PIERRE NEW ORDERS: INCREASE RATE TO 24; INCREASE FIO2 
% 

-------------------------------------------------------------------------------

Addendum: 09/19/18 at 1103 by Tulio Joyner RT

-------------------------------------------------------------------------------

FREDA/MANDY NOTIFIED OF VENTILATOR CHANGE
ACTUAL ETT PLACEMENT AT 21cm
ADMIN PAIN MED 1 HR AGO. PT DENIES PAIN AT THIS TIME. PAIN MED EFFECTIVE WILL CONTINUE TO 
MONITOR.
ADMINISTERED ATIVAN PRN FOR AGITATION DUE TO PATIENT PRESENTING RESTLESSNESS. PATIENT 
TOLERATES WELL. WILL CONTINUE TO MONITOR.
ADMINISTERED MORPHINE 1MG PRN FOR 8/10 PAIN, PATIENT TOLERATED WELL.
ADMINISTERED MORPHINE FOR PAIN, ZOSYN AND HYDRALAZINE. PT TOLERATED WELL. WILL CONTINUE TO 
MONITOR PT.
ADMINISTERED MORPHINE FOR PAIN. ADMINISTERED MAG RIDER. PT TOLERATING WELL. WILL CONTINUE TO 
MONITOR PT.
ADMINISTERED SCHEDULED MEDS AS ORDERED, PATIENT TOLERATED WELL. PROVIDED ORAL CARE, MINIMAL 
SECRETIONS FROM SUCTIONING. PATIENT TOLERATED WELL. RASS OF -3 NOTED. NO SIGNS OF DISTRESS 
AT THIS TIME. WILL CONTINUE TO MONITOR.
ADMINISTERED VIT C AND HELD HYDRALAZINE FOR DECREASE BP /79. PT TOLERATED WELL.
AM CARE DONE, PT TURNED AND REPOSITIONED, VAPP ORAL CARE DONE, PT TOLERATED WELL.
AM PERSONAL HYGIENE CARE RENDERED BY CNA. ABLE TO VERBALIZE NEEDS WELL. NO PAIN COMPLAINTS. 
PT. EDUCATED RE : METHAMPHETAMINE ADVERSE REACTIONS TO BODY. PT. EAGER TO LEARN MORE ABOUT 
IT. STATED THAT SHE IS USING IT A LOT SINCE 6 MONTHS AGO. PT. OPEN TO LEARNING. NEEDS 
ANTICIPATED AND MET.
ASSESS PATIENT'S SWALLOWING WITH DR. PIERRE AT BEDSIDE, GAVE PATIENT SOME ICE CHIPS, PATIENT 
TOLERATED WELL. DR. PIERRE STATED OKAY TO D/C NG TUBE. FLUSH NG TUBE WITH 50 ML OF WATER, 
REMOVED NG TUBE FROM RIGHT NARES, PATIENT TOLERATED WELL. NO SIGNS OF DISTRESS NOTED. WILL 
CONTINUE TO MONITOR
AWAKE AND ALERT PATIENT PRESENTING AND C/O DRY UPPER AIRWAY PLACED ON SUPPLEMENTAL  OXYGEN 
AT 28%/6 LPM VIA COOL AEROSOL TO MASK
AWAKE AND ALERT RESPONSIVE NO EVIDENCE OF SOB NOTED PATIENT WITH BREAKFAST TRAY AT THIS TIME 
RCP TO ATTEMPT HHN THERAPY AT A LATER TIME
AWAKE AT THIS TIME. PROVIDED WITH APPLE JUICE AS REQUESTED. ABLE TO VERBALIZE IN Lao . 
A/O X 4. ROM X 4.
BEEN SLEEPING WELL. WAKES UP EASILY WHEN TOUCHED. TELEMETRY MONITORING. VERBALIZES NEEDS 
WELL. USES CALL LIGHT.
BLADDER SCAN DONE AS ORDERED BY DR CRAWFORD; NOTED 282ML URINE.
BLOOD SUGAR CHECK 106. NO RISS COVERAGE. VERBALIZES WELL AND SHOWED ME SHE CAN MOVE RIGHT 
HAND NOW. A/O X 4.  DENIES PAIN AT THIS TIME.
BLOOD SUGAR CHECKED:111. NO COVERAGE NEEDED. PT DENIES ANY OTHER NEEDS.
BLOOD SUGAR CHECKED:87. NO COVERAGE NEEDED. PT DENIES ANY OTHER NEEDS.
CALL BACK FROM DR TITA PIERRE REVIEWED ABG SAMPLE REPORT MD STATES "OK SOUNDS GOOD" NO NEW 
ORDERS
CALLED [employer] AND SPOKE TO [employer] TO ASK WHEN THEIR STAFF COMING TO  THE 
FETAL [place]. SHE SAID "WE HAVEN'T RECEIVED ANY DOCUMENTS YET." [employer] GAVE A NUMBER TO FAX 
THE [phone] AND ADDRESS TO [place]. WILL FAX FACE SHEET.
CENTRAL LINE DRESSING CHANGED. PATIENT AWAKE IN BED. PATIENT'S  PRESENT AT BEDSIDE
CLARIFIED ORDER FOR HYDRALAZINE DUE TO PATIENT ON COREG AND LASIX LOWERING PATIENT'S BP, DR. PIERRE STATES OKAY TO GIVE HYDRALAZINE. WILL CONTINUE TO MONITOR
CPR INITIATED AT THIS TIME DAVINA RIBEIROP;DAVINA ABRAHAMP; DAVINA PALMAP; ANDREW PRESCOTT AT 
BEDSIDE
DAY SHIFT RN GIVEN REPORT FOR CONTINUITY OF CARE. NO ACUTE DISTRESS NOTED.
DECREASED PT'S FIO2 TO 40% NO SOB OR DISTRESS NOTED. VENT CHECK DONE. DR GORMAN AT 
BEDSIDE. NO NEW ORDERS. WILL CONTINUE TO MONITOR.
DIALYSIS NURSE MAYITO MADE AWARE THAT STANISLAV CATH IS OK TO USE FOR DIALYSIS.
DIALYSIS RN AT BEDSIDE TO START HEMODIALYSIS ON PATIENT, NO SIGNS OF DISTRESS NOTED. DR. GABBY ROJAS.
DID WOUND CARE WITH ILA [id number] ON PHONE. PATIENT'S  AT BEDSIDE LEARNING HOW TO 
PERFORM WOUND CARE FOR PATIENT. PATIENT'S  VERBALIZED UNDERSTANDING OF HOW TO PERFORM 
WOUND CARE.
DR BENÍTEZ, CARDIOLOGIST STILL IN THE UNIT.CRITICAL LAB RESULT POTASSIUM 7.5 REPORTED TO 
MD.NEW ORDER RECEIVED, REPEAT BMP AND TROPONIN LEVEL
DR CRAWFORD COMPLETED ALBUTEROL RX NOTE, RX NOTE GIVEN TO PT.
DR ERWIN @ BEDSIDE
DR FONTANEZ AND 2 O/R NURSES ARE HERE. PT AGREED TO HAVE THE PROCEDURE DONE. THEY WILL GET 
THE CONSENT SIGNED AND TAKE HER TO O/R. WILL AWAIT HER RETURN.
DR GORMAN AT BEDSIDE REVIEWED ABG ORDER PENDING NEW ORDER: DISCONTINUE ABG ORDER
DR MOSES GORMAN PLACED ON CPAP/PS FOR WEANING TRIAL BY MD NEW ORDER: EXTUBATE PATIENT
DR PIERRE @ BEDSIDE, UPDATED. NO NEW ORDERS GIVEN.
DR PIERRE @ BEDSIDE. NOTIFIED MD ABOUT INCREASED BLOOD PRESSURE. MD AWARE AND WILL FOLLOW UP 
WITH ORDERS. MD ALSO MADE AWARE OF LOW URINE OUTPUT, 200 ML/SHIFT.
DR TAYLOR @ BEDSIDE
DR. BANERJEE AND RESIDENT PHYSICIANS AT BEDSIDE, UPDATED ON PATIENT'S CONDITION. WILL FOLLOW UP 
ON ANY ORDERS.
DR. BANERJEE AND RESIDENT PHYSICIANS AT BEDSIDE, UPDATED ON PATIENT'S CONDITION. WILL FOLLOW UP 
ON ANY ORDERS.
DR. CASILLAS AT BEDSIDE TO CHECK ON PATIENT, UPDATED ON PATIENT'S CONDITION. NO NEW ORDERS 
RECEIVED.
DR. CASILLAS AT BEDSIDE, UPDATED ON PATIENT'S CONDITION. WILL FOLLOW UP ON ANY ORDERS.
DR. GORMAN AND DR. PIERRE AT BEDSIDE FOR EXTUBATION, PROPOFOL AND MORPHINE ON HOLD, TUBE 
FEEDING ON HOLD. NO SIGNS OF DISTRESS NOTED. WILL CONTINUE TO MONITOR
DR. GORMAN AND DR. PIERRE AT BEDSIDE. UPDATED AT PATIENT'S CONDITION. WILL FOLLOW UP ON ANY 
ORDERS.
DR. GORMAN AT BEDSIDE, UPDATED ON PATIENT'S CONDITION. DR. GORMAN PLACED PATIENT ON CPAP 
TRIAL, STATES THAT PATIENT IS READY OF EXTUBATION.
DR. GORMAN, DR. PIERRE, AND RT AT BEDSIDE. EXTUBATED PATIENT, PATIENT TOLERATED WELL.
DR. HART AND DR. PIERRE AT BEDSIDE, UPDATED ON PATIENT'S CONDITION. WILL FOLLOW UP ON ANY 
ORDERS.
DR. HART, DR. PIERRE,  TECH AT BEDSIDE FOR PLACEMENT OF STANISLAV CATHETER. NO SIGNS OF DISTRESS 
NOTED AT THIS TIME, PATIENT IS AT RASS -3. WILL CONTINUE TO MONITOR
DR. PIERRE AT BEDSIDE TO CHECK ON PATIENT, UPDATED ON PATIENT'S CONDITION, WILL FOLLOW UP ON 
ANY ORDERS.
DR. PIERRE AT BEDSIDE, STATES NO NEED TO CONTINUE WITH CVP MONITORING. WILL FOLLOW UP ON ORDER.
DR. TAYLOR AT BEDSIDE, UPDATED ON PATIENT'S CONDITION, WILL FOLLOW UP ON ANY ORDERS.
DR. TAYLOR CALLED REGARDING PATIENT'S HIGH BLOOD PRESSURE, ORDERS RECEIVED, WILL CARRY OUT.
DR. TAYLOR SPEAKING TO  REGARDING PATIENT'S CONDITION/ MEDICAL HX, USING TELEPHONE 
 #[id number]
DRY WEIGHT: 89.8 KG
DRY WEIGHT: 89.8 KG
ECHO DONE
ECHO TECH AT BEDSIDE FOR ECHO. PATIENT IS TACHYCARDIC AT THIS TIME, DR. PIERRE IS AWARE. WILL 
CONTINUE TO MONITOR
EKG DONE.
ENDORSED CONTINUITY OF CARE TO NIGHT SHIFT RN, NO SIGNS OF DISTRESS NOTED.
ENDORSED CONTINUITY OF CARE TO NIGHT SHIFT RN, PATIENT PRESENTS NO SIGNS OF DISTRESS AT THIS 
TIME. RT AT BEDSIDE.
ENDORSED PATIENT TO NIGHT SHIFT NURSE. PATIENT IS RESTING IN BED FAMILY AT BEDSIDE. IN 
STABLE CONDITION.
ENDORSED PT TO THE NIGHT SHIFT NURSE AT BEDSIDE FOR CONTINUITY OF CARE. PT IS IN STABLE 
CONDITION.
ENDORSED PT TO THE NIGHT SHIFT NURSE AT BEDSIDE FOR CONTINUITY OF CARE. PT IS IN STABLE 
CONDITION.
ENDORSED REPORT TO DAYSHIFT NURSE AT BEDSIDE FOR CONTINUITY OF CARE.
ENDORSED TO NIGHT SHIFT NURSE. PATIENT IS IN GOOD MOOD TALKING TO VISITORS. HAS NO SOB. IV 
SITE IS CLEAN AND PATENT. BED ON LOWEST POSITION, CALL LIGHT WITHIN REACH.
ENDORSED TO THE NEXT RN FOR CONTINUITY OF CARE. ABLE TO VERBALIZE NEEDS IN Micronesian WELL. 
NEEDS MET. TELEMETRY MONITORING.
GAVE REPORT TO NIGHTSHIFT NURSE AT BEDSIDE. PATIENT IN STABLE CONDITION.
GISEL RT AT BEDSIDE; VENT SETTING FIO2 DECREASED TO 50%.02SAT 97% AT THIS TIME.WILL CONTINUE 
TO CLOSELY MONITOR PT.
HEMODIALYSIS COMPLETE, PATIENT TOLERATED WELL, NO SIGNS OF ACUTE DISTRESS. 1500 OUTPUT PER 
HEMODIALYSIS RN.
INCREASED FIO2 TO 50% TO DUE POST AM CARE AND DESATURATION TO 91% VITAL SIGNS STABLE, BS- 
BILATERAL CLEAR
INSERTED NG TUBE, 16 Ethiopian, PATIENT TOLERATED WELL. RASS IS -3, NO SIGNS OF DISTRESS NOTED.
INSERTED TIMMONS CATHETER, 16 Croatian, PATIENT TOLERATED WELL, URINE OUTPUT NOTED.
LOC AWAKE AND ALERT FOLLOWS RCP COMMANDS PRE EXTUBATION: OROPHARYNGEAL SUCTIONING WITH NO 
SECRETION RETURN NO PULMONARY SUCTION REQUIRED BREATH SOUNDS CLEAR BILATERAL WITH GOOD CHEST 
RISE AND AERATION THROUGHOUT PULMONARY GRAVES UNSECURED ANCHOR FAST STRAP DEFLATED CUFF 
BALLOON FLAT REQUESTED PATIENT TO TAKE DEEP BREATHS ON INSPIRATION REMOVED ENDOTRACHEAL TUBE 
PLACED PATIENT ON SUPPLEMENTAL OXYGEN AT 2 LPM VIA NC DR TITA PIERRE AT BEDSIDE REVIEWED POST 
EXTUBATION IF PATIENT PRESENTS WITH UPPER AIRWAY "HOARSNESS" RCP TO PLACE ON A COOL AEROSOL 
TO MASK AND/OR GIVE EPINEPHRINE INHALATION THERAPY INCENTIVE THERAPY FOR LUNG EXPANSION
MAYITO PERALES, DIALYSIS NURSE MADE AWARE THAT PATIENT HAS AN ORDER FOR DIALYSIS.  AND STANISLAV 
CATHETER PLACED, AWAITING FOR X RAY CONFIRMATION.
MD CRAWFORD AT BEDSIDE BECAUSE PT HAS SWELLING ON LEFT LOWER LEG. MD STATED SHE WILL ORDER US 
SCAN. WILL CONTINUE TO MONITOR.
MORNING CARE PROVIDED TO PT. TIMMONS CARE PROVIDED. PT ABLE TO ASSIST WITH TURNING AND 
REPOSITIONING. PT HAS GOOD URINE OUTPUT AT THIS TIME. PT TOLERATED BEING TURNED AND 
REPOSITIONED WELL. DRESSING DRY, INTACT AND NO DRAINAGE NOTED.
NO SOB NO S/S OF DISTRESS ON RA. WILL CONTINUE TO MONITOR.
NOTIFIED DR BONILLA OF MAG 1.4. SHE TEXTED DR PIERRE TO NOTIFY HIM. DR PIERRE ATTENDING. NO ORDERS. 
WILL WAIT FOR ORDER.
NOTIFIED DR CRAWFORD OF ELEVATED /101, MD CRYSTAL MD TO SEE PATIENT. WILL CONTINUE TO 
OBSERVE
NOTIFIED DR. CRAWFORD OF DISCOLORATION TO R UPPER ARM/ANTECUBITAL SPACE. PER PREVIOUS SHIFT, 
VASOPRESSORS WERE INFUSING INTO PERIPHERAL IV LOCATED ON THE R UPPER ARM/ANTECUBITAL AREA; 
IV HAS SINCE BEEN DISCONTINUED. 

-------------------------------------------------------------------------------

Addendum: 09/19/18 at 2207 by Peter Burns RN

-------------------------------------------------------------------------------

ADVISED MD OF EXTRVASATION PROTOCOL. MD LUKAS MCLEOD. WILL FOLLOW PER HOSPITAL POLICY.
OB RN AT BEDSIDE ASSESSING PATIENT.
OB RN AT BEDSIDE TO ASSESS PATIENT, UPDATED ON PATIENT'S CONDITION. WILL CONTINUE TO MONITOR
OB RN AT BEDSIDE TO EXAMINE PATIENT, NO SIGNS OF DISTRESS NOTED AT THIS TIME, HEMODIALYSIS 
STILL IN PROCESS, WILL CONTINUE TO MONITOR
OFFERED MEDICATIONS TO PATIENT. PATIENT WANTS TO EAT FIRST BEFORE SHE TAKES MEDICATION. WILL 
OFFER MEDICATIONS AGAIN AFTER BREAKFAST.
OXYGEN SATURATION WITHIN NORMAL LIMIT AT THIS TIME. NO SIGNS OF SHORTNESS OF BREATH NOTED. 
BREATHING EVEN AND UNLABORED. VS STABLE. CLEAR AND YELLOW URINE DRAINING FROM TIMMONS CATHETER 
IN PLACE.
PATIENT  AT BEDSIDE, NO SIGNS OF DISTRESS NOTED. WILL CONTINUE TO MONITOR
PATIENT AAOX3, DR. SU AT BEDSIDE TO SPEAK WITH PATIENT REGARDING PATIENT'S CONDITION. 
RESTRAINTS REMOVED AS PATIENT IS MORE ALERT.
PATIENT ASKED TO SPEAK WITH HER . WERE ABLE TO GET A HOLD OF HIM HE WILL BE COMING IN 
LATER ON TODAY.
PATIENT BEING EVALUATED BY DR OSPINA
PATIENT HAS BEEN RE-SCREENED AND RE-CATEGORIZED AS HIGH NUTRITIONAL RISK DUE TO INTUBATION. 
PT WILL BE SEEN WITHIN 1-2 DAYS FROM NOW.



9/19/18-9/20/18



TONE ARENAS RD
PATIENT HAS BEEN SCREENED AND CATEGORIZED AS LOW NUTRITION RISK. PATIENT WILL BE SEEN WITHIN 
7 DAYS OF ADMISSION.



9/24/18



TONE ARENAS RD
PATIENT IN THE ROOM WITH THE  BABY.  PRESENT IN THE ROOM
PATIENT INTUBATED AT THIS TIME BY DR YONAS BATISTA ETT #7.5 SECURED AT 26cm WITH AN ANCHOR 
FASTCONFIRMED BY BILATERAL AUSCULATATION BY MD CHEST RISE AND CO2 CXR TO FOLLOW NEW ORDERS 
FROM MD: XAVIER SHORE  PEEP 5 FIO2 50%

-------------------------------------------------------------------------------

Addendum: 09/19/18 at 0744 by Tulio Joyner RT

-------------------------------------------------------------------------------

ACTUAL INTUBATION TIME AT 0615

-------------------------------------------------------------------------------

Addendum: 09/19/18 at 0755 by Tulio Joyner RT

-------------------------------------------------------------------------------

CO2 DETECTOR
PATIENT IS AWAKE ATE ABOUT 80% OF HER LUNCH. PATIENT ASKED ME TO CALL HER . SHE 
STATES HE WILL COME IN AGAIN LATER WITH HER SON. NO DISTRESS NOTED. IN STABLE CONDITION. 
CALL LIGHT IS WITHIN REACH.
PATIENT REPORT GIVEN AT BEDSIDE. PATIENT ENDORSED IN STABLE CONDITION
PATIENT REPORTED HIGH BLOOD PRESSURE AND NURSE WAS IN THE ROOM. TREATMENT WAS TOLERATED WELL 
AND PATIENT WAS INFORMED SHE COULD REQUEST PRN TX EVERY Q4 IF NEEDED.
PATIENT RESTING AT THIS TIME. NO DISTRESS NOTED. WILL CONTINUE TO MONITOR PATIENT.
PATIENT SEEN BY PT
PATIENT SISTERS AT BEDSIDE, PATIENT IS UPSET OVER LOSS OF THE BABY. WILL CONTINUE TO MONITOR
PATIENT SLEEPING WHEN ENTERED ROOM. WOKE PATIENT AND ADVISED I HAD HER BREATHING TREATMENT, 
PATIENT RESPONDED OK. TREATMENT LATE FROM SCHEDULED TIME DUE TO DOCTOR/NURSE CONFERENCE WITH 
PATIENT ON CARE AND WAS GIVEN A BATH RIGHT AFTER. PATIENT APPEARS COMFORTABLE WITH NO 
RESPIRATORY DISTRESS OR SOB. B/S: CLEAR BILATERALLY.
PATIENT SLEPT THROUGH TREATMENT. VERBALLY DENIED FEELING SOB. O2 98% ON RA, PULSE OF 96, 
RATE OF 16. BREATH SOUNDS WERE CLEAR. TREATMENT TOLERATED WELL.
PATIENT TOOK AM MEDICATIONS. APRESOLINE NOT GIVEN TO PATIENT BECAUSE BLOOD PRESSURE IS AT 
136/80, 92 HR.
PATIENT TRANSFERRED TO LABOR AND DELIVERY OR TO STAT C=SECTION PATIENT REMOVED FROM 
VENTILATOR PLACED ON AMBU BAG TO ETT WITH SUPPLEMENTAL OXYGEN AT 15 LPM VIA E-TANK BAG 
DEPRESSION EVERY SIX SECONDS BAGGING GIVEN TO ANESTHESIOLOGIST VENTILATOR SETTINGS OF AC 
MODE RATE 18  L PEEP 5 cmH2O fio2 50% GIVEN TO MELANIE MILLS/MANDY
PATIENT WAS BROUGHT TO ICU ACCOMPANIED BY 2 L&D NURSES AT 0610, PATIENT WAS IN RESPIRATORY 
DISTRESS, BECAME UNRESPONSIVE, CALLED CODE BLUE. PATIENT WAS RESUSCITATED BACK AT 0638. 
(REFER TO CODE BLUE RECORD). PATIENT STARTED ON DOPAMINE DRIP, AMIODARONE DRIP. CALLED L&D 
RN KEISHA WISEMAN FOR REPORT,  WILL COME TO ICU TO GIVE REPORT.
PATIENT'S  AT BEDSIDE WITH . UPDATED ON PATIENT'S CONDITION. WILL CONTINUE 
TO MONITOR
PATIENT'S  AT BEDSIDE, UPDATED ON PATIENT'S CONDITION. PATIENT IS SEDATED, RASS-3. 
WILL CONTINUE TO MONITOR
PATIENT'S  AT BEDSIDE, WILL CONTINUE TO MONITOR.
PATIENT'S SISTER AT BEDSIDE, UPDATED ON PATIENT'S CONDITION BY 
PATIENT'S WEIGHT IS 89.8KG
PER PT, NOT READY TO GO HOME. SPOKE TO DR. PIERRE. DR PIERRE WILL SPEAK TO THE PT. WILL LET ME 
KNOW THE PLAN.
PHONE CALL MADE TO DR CRAWFORD REGARDING PTS BP; 165/105.MADE AWARE MORPHINE DRIP WAS ALREADY 
INCREASED TO 2MG/HR AND LASIX 40MG IVP GIVEN.NO NEW ORDER AT THIS TIME
PLACED ON VENTILATOR AT THIS TIME
PREPARED PATIENT FOR TRANSFER TO L&D, PLACED ON MONITOR. ALL SAFETY PRECAUTIONS ENFORCED, 
WILL CONTINUE TO MONITOR.
PROVIDED ORAL CARE, PATIENT TOLERATED WELL. TURNED AND REPOSITION PATIENT, NO SIGNS OF ACUTE 
DISTRESS. WILL CONTINUE TO MONITOR
PROVIDED ORAL CARE, PATIENT TOLERATED WELL. WILL CONTINUE TO MONITOR
PROVIDED ORAL CARE, PATIENT TOLERATED WELL. WILL CONTINUE TO MONITOR
PROVIDED ORAL CARE, PATIENT TOLERATES WELL. NO SIGNS OF DISTRESS NOTED, RASS AT -3. WILL 
CONTINUE TO MONITOR
PT AROUSABLE TO NAME, ABLE TO SQUEEZE HANDS AND MOVE TOES, GENERALIZED WEAKNESS NOTED. 
DENIES PAIN WHEN ASKED, EYES ARE ABLE TO TRACK +3 PERRL. REORIENTED PT TO UNIT AND INFORMED 
PT ABOUT IMPORTANCE OF IV LINES, AND TUBES IN PLACE.  ADVISED NOT TO PULL ANYTHING OUT. NSR 
90S NOTED, +2 GENERALIZED EDEMA NOTED PALPABLE PERIPHERAL PULSES. ETT TO VENT 7.5 MM ETT 22 
CM @ LIP. VENT SETTINGS: AC 40% FIO2 R 24  PEEP 6. ABD SOFT NON DISTENDED. ACTIVE 
BOWEL SOUNDS, NGT TO R NARES + PLACEMENT VITAL 1.2 RUNNING. NO RESIDUALS NOTED. TIMMONS CATH 
IN PLACE CLEAR YELLOW URINE NOTED. S/P C SECTION, TRANSVERSE INCISION TO SUPRAPUBIC/LOWER 
ABDOMINAL AREA DRESSING CDI. CENTRAL LINE TO RIJ NOTED, PROPOFOL DRIP @ 30 MCG/KG/MIN, 
MORPHINE DRIP @ 2 MG/HR, AND IVF INFUSING @ 40 ML/HR. CVP MONITORING IN PLACE. LIJ STANISLAV 
CATH IN PLACE. ALL SAFETY ALARMS/PRECAUTIONS IN PLACE AND VERIFIED. NO ACUTE DISTRESS NOTED
PT ARRIVED BACK ON THE UNIT. PT IS IN STABLE CONDITION. PT C/O PAIN BUT WAS RECENTLY 
MEDICATED. PER O/R, HAD A HARD TIME WAKING HER UP AND O2 SAT DESAT. WILL WAIT AND USE 
NON-MEDICATION METHODS TO MAKE HER MORE COMFORTABLE. ICE CHIPS, JUICE OFFERED. WILL CONTINUE 
TO MONITOR PT.
PT C/O 8/10 PAIN. IV SITE DISCONTINUED BY AM SHIFT NURSE. EXPLAINED TO PT IF SHE TAKES [place] 
SHE WILL NEED TO STAY FOR AT LEAST 2HR FOR OBSERVATION OF ADVERSE SIDE EFFECTS PENDING 
DISCHARGE. PT VERBALIZED UNDERSTANDING BUT STATES SHE WANTS TO GO HOME SOON & PO 
ACETAMINOPHEN IS ADEQUATE FOR HER. PO ACETAMINOPHEN ADMINISTERED.
PT GIVEN MEDICATIONS AS ORDERED W/ TEACHINGS. RT ARM DRESSING CHANGED.
PT HAD TOTAL OF 3X SMALL LIQUID  BOWEL MOVEMENTS THROUGHOUT THE SHIFT, YELLOW-BROWN STOOLS 
NOTED. PT IS INCONTINENT AT TIMES, UNABLE TO SAVE STOOL SPECIMEN FOR C-DIFF COLLECTION. PT 
HAS BEEN ON IV ABX FOR MORE THAN 24 HRS.
PT IS STARTING PHYSICAL THERAPY. CANT TAKE HHN TX NOW. PT IS AWARE TO CALL RT ANY TIME IF 
SHE IS SOB. PT VERBALIZED UNDERSTANDING . WILL CONTINUE TO MONITOR.
PT IS VISITING WITH A FRIEND. NO SIGNS OF DISTRESS AT THIS TIME. WILL CONTINUE TO MONITOR 
PT.
PT LEFT UNIT AT THIS TIME VIA WHEELCHAIR, ACCOMPANIED BY CNA, SPOUSE, & CHILD. PT DENIES ANY 
DISCOMFORT UPON DISCHARGE, FLACC 0, RESPIRATIONS EVEN & UNLABORED. RT FA & RT NECK DRESSINGS 
CLEAN, DRY, & INTACT. NAME BAND REMOVED. ALL BELONGINGS WITH SPOUSE.
PT REQUESTED FOR PRESCRIPTION NOTE FOR ALBUTEROL PUFF. DR CRAWFORD NOTIFIED OR PT'S REQUEST.
PT RESTING IN BED ASSESS VITALS ALL WITHIN NORMAL LIMITS WILL CONTINUE TO MONITOR.
PT ROOM IS A MESS. HOUSE KEEPING HERE TO CLEAN ROOM. EVERYTHING ON THE FLOOR. SPOUSE AND 
CHILD GONE. PT CRYING. 

DR PIERRE AND SS THERE W/ PT DISCUSSING DISCHARGE. POSSIBLY TODAY OR TOMORROW. WILL CONTINUE TO 
MONITOR PT.
PT SLEEPING SOUNDLY. NO SIGNS OF DISTRESS. WILL CONTINUE TO MONITOR PT.
PT SWITCHED FROM OXYGEN MASK TO NASAL CANNULA. OXYGEN CURRENTLY AT 2L/MIN AND OXYGEN 
SATURATION AT 95%
PT TRANSFERRED TO TELE UNIT 107B VIA BED ON STABLE CONDITION. REPORT GIVEN TO TELE NURSE 
INGRED.
PT TURNED, REPOSITIONED, AND WAS ABLE TO TOLERATE. PT ABLE TO MAKE NEEDS KNOWN. PT WAS ABLE 
TO SWALLOW HER MEDICATIONS WITHOUT DIFFICULTY. KEPT HOB AT 30 DEGREES.
PT. ABLE TO SWALLOW P.O. MEDICATIONS ADMINISTERED WELL. ABLE TO SWALLOW WATER WITH OUT ANY 
S/S OF ASPIRATION. TOLERATED WELL.
PT. ABLE TO VERBALIZE SIMPLE NEEDS WELL IN [place]. NEEDS WILL BE MET . TELEMETRY 
MONITORING.
PT. HAD LIQUIDY BM 4 X ALREADY SINCE START OF SHIFT. CLEANED BY CNA AND NURSE. PT. ABLE TO 
TURN SELF WITH ASSIST. ON IVF ABT. ABLE TO USE CALL LIGHT FOR HELP. DENIES PAIN AT THIS 
TIME. TELEMETRY MONITORING.
PT. REQUESTED FOR PAIN RELIEVER RT STATED THAT HER RAC I AND D IS STARTING TO BOTHER HER AND 
THE PAIN IS KIND OF EATING HER.  7/10 .  MEDICATED AS REQUESTED.
PT. SLEEPING. DRESSING TO RAC INTACT AND NO BLEEDING.
PT. SLEEPING. WAKES UP EASILY WHEN TOUCHED OR CALLED BY NAME. NO COMPLAINTS DONE. TELEMETRY 
MONITORING.
PT. WENT BM IN BEDSIDE COMMODE ASSISTED BY SPOUSE. PT. IN GOOD AFFECT. ABLE TO USE CALL 
LIGHT FOR HELP.
PT. WOKE UP AND STATED SHE JUST CAME BACK FROM USING HER BEDSIDE COMMODE TO GO HAVE A BM. 
SITTING AT THIS TIME IN BED. ABLE TO USE CALL LIGHT FOR HELP. A/O X 4. ROM X 4. DENIES PAIN 
AT THIS TIME.
RCV'D PT ON MECHANICAL VENTILATION WITH SETTINGS AC 24,550,+6,50%. PT IS INTUBATED WITH 7.5 
ETT AT 21 CM AT LIP. ETT ON RIGHT SIDE OF MOUTH AND SECURED. VENT IS CONNECTED TO RED 
OUTLET. ALARMS AUDIBLE. AMBU BAG AT BEDSIDE. CLEAR BREATH SOUNDS. HHN TX GIVEN WITH NO 
ADVERSE REACTION. ETT CUFF PRESSURE IS 30 XMH2O. NO SOB OR DISTRESS NOTED. WILL CONTINUE TO 
MONITOR.
RECEIVED BEDSIDE REPORT FROM NIGHT SHIFT RN FOR CONTINUITY OF CARE. PATIENT IS SEDATED WITH 
PROPOFOL AT 35MCG/KG/HR, RASS -3. PATIENT WEIGHT IS 89.8. SKIN IS NOT INTACT, PATIENT HAS AN 
EXTRAVASATION TO RIGHT UPPER ARM AND S/P POST CESARIAN INCISION TO LOWER ABDOMEN. PATIENT 
HAS CENTRAL LINE TO RIGHT IJ AND ARTERIAL LINE TO LEFT RADIAL ARTERY. PATIENT HAS ETT TO 
VENT, SETTINGS ARE AC/VC RATE 24, FIO2 50%, , PEEP 5. BREATHING IS EVEN AND UNLABORED, 
O2 SAT IS 99%. SR ON CARDIAC MONITOR, HYPERTENSIVE, RESIDENT PHYSICIANS AWARE, FLACC 0. 
PATIENT HAS NGT TO RIGHT NARES, NO RESIDUAL. TIMMONS CATHETER IN PLACE TO YELLOW URINE, PER 
NIGHT SHIFT  ML OUTPUT DURING SHIFT. PATIENT TURNED AND REPOSITIONED, TOLERATED WELL. 
HOB IS 30 DEGREES IN A LOW POSITION. ALL SAFETY PRECAUTIONS ASSESSED AND ENFORCED. CALL 
LIGHT WITHIN REACH. WILL CONTINUE TO MONITOR
RECEIVED BEDSIDE REPORT FROM NIGHT SHIFT RN FOR CONTINUITY OF CARE. PATIENT IS SEDATED, 
OPENS EYES TO NAME, RASS -3 WITH PROPOFOL AT 40 MCG/KG/HR, PATIENT'S DRY WEIGHT IS 89.8KG. 
PATIENT SKIN IS WARM AND DRY, NOT INTACT, SHE HAS S/P INCISION TO LOWER ABDOMEN POST 
CESARIAN AND EXTRAVASATION TO UPPER RIGHT ARM. PATIENT HAS CENTRAL LINE TO RIGHT IJ, 
ASYMPTOMATIC AND PATENT, SHE HAS A STANISLAV CATHETER TO LEFT IJ, ASYMPTOMATIC AND PATENT. 
PATIENT HAS ETT TO VENT, SETTINGS ARE AC/VC, RATE 24, FIO2 28, , PEEP 6. BREATHING 
EVEN AND UNLABORED, SPO2 100%. PATIENT IS SINUS RHYTHM ON CARDIAC MONITOR, BP /78, CVP 
3, FLACC 0. PATIENT HAS NGT TO RIGHT NARES TO TUBE FEEDING AT 35ML/HR, NO RESIDUAL NOTED. 
TIMMONS CATHETER IN PLACE TO CLEAR LIGHT YELLOW URINE. SOFT WRIST RESTRAINTS ASSESSED AND IN 
PLACE, HOB IS ELEVATED TO 30 DEGREES, ALL SAFETY PRECAUTIONS ENFORCED. NO SIGNS OF DISTRESS 
NOTED, CALL LIGHT WITHIN REACH. WILL CONTINUE TO MONITOR
RECEIVED BEDSIDE REPORT. PATIENT IS AWAKE AND MORE ALERT THAN YESTERDAY. PATIENT STATES SHE 
REMEMBERS ME FROM YESTERDAY. SHE IS IN STABLE CONDITION. WILL CONTINUE TO MONITOR CALL LIGHT 
WITHIN REACH.
RECEIVED CALL FROM DR. HART, STATES TO STOP INFUSION OF OXYTOCIN IN LACTATED RINGERS DUE TO 
INCREASE IN POTASSIUM, WILL CARRY OUT.
RECEIVED FROM AM RN IN BED AWAKE AND WITH FAMILY MEMBERS VISITING. NONE VERBAL AT THIS TIME. 
PT. JUST LOOKS AT ME. DX. OF CARDIAC ARREST. PT. IS AN ICU TRANSFER JUST THIS AFTERNOON. 
WITH ABDOMINAL DRESSING  INTACT AND NO BLEEDING FROM S/P 9/19/18 CS.  PT. WITH LIJ STANISLAV 
CATHETER USED FOR HD AND RIJ CL FOR IV'S.  TIMMONS CATHETER IN PLACE AND DRAINING WITH YELLOW 
URINE. CALL LIGHT WITH IN REACH AND RE-ORIENTED FAMILY MEMBERS AND PT ABOUT IT. BED ALARM 
ON.  ON 02  WITH 02 SAT OF WITH  99 %.
RECEIVED FROM AM RN IN BED SITTING UP. VISITORS AROUND HER. TALKING WITH HER VISITORS. GOOD 
AFFECT. NO SOB. DENIES ANY DOLOR AT THIS TIME. CARE PLANS FOR THE NIGHT DISCUSSED WITH HER. 
TELEMETRY MONITORING. CALL LIGHT WITH IN REACH AT ALL TIMES. ABLE TO VERBALIZE NEEDS WELL. 
NO COMPLAINTS DONE AT THIS TIME. ENCOURAGED TO CALL FOR ANY HELP SHE MAY NEED OR IF SHE HAS 
ANY DOLOR. DRESSING TO CS SITE INTACT AND NO BLEEDING NOTED.
RECEIVED FROM AM RN IN BED WITH VISITORS. DRESSING TO RAC INTACT AND NO BLEEDING NOTED. ABLE 
TO VERBALIZE NEEDS WELL. ABLE TO USE CALL LIGHT WELL. A/O X 4. ROM X 4. CARE PLANS FOR THE 
NIGHT DISCUSSED WITH HER. DENIES ANY PAIN AT THIS TIME.  ENCOURAGED TO CALL FOR ANY HELP SHE 
MAY NEED OR IF IN PAIN.
RECEIVED PT FROM ICU, ALERT, AWAKE AND ORIENTED X2, CONFUSED. NO SOB NOTED, ON 02 AT 2LPM 
VIA NASAL CANNULA. NO C/O PAIN AT THIS TIME. WITH TRIPLE LUMEN CENTRAL ON RT IJ, PATENT , 
SITE CLEAN, DRY AND INTACT. WITH LT IJ STANISLAV CATH, SITE CLEAN, DRY AND INTACT. BUE 
SWELLING NOTED, ELEVATED WITH PILLOWS. CHEST, DIMINISHED AIR ENTRY TO THE BASES. ABDOMEN 
LARGE BUT SOFT, LOWER ABDOMINAL INCISION S/P  DRESSING DRY AND INTACT. WITH TIMMONS 
CATHETER DRAINING MODERATE AMOUNTS OF CLEAR YELLOW URINE. INSTRUCTED PT TO CALL FOR 
ASSISTANCE, CALL LIGHT WITHIN REACH, PT VERBALIZED PARTIAL UNDERSTANDING.
RECEIVED PT FROM THE NIGHT SHIFT NURSE AT BEDSIDE FOR CONTINUITY OF CARE. PT IS AWAKE AND 
ORIENTED. RE-INTRODUCED MYSELF AND UPDATED THE BOARD. MD'S ROUNDING. PT STILL REFUSES TO 
HAVE THE I & D WITH DR OFNTANEZ. 

PT HAD VENOUS DOPPLER ON THE LLE. NO BLOOD CLOTS. PT STILL HAS DRESSING FOR THE C SEC 
INCISION. R UA EXTRAVASATION, AMAURY. LBM WAS YESTERDAY X2. RIJ TRIPLE LUMEN 1/2 NS AT 30ML. 
V/S WITHIN NORMAL RANGE. PT DENIES PAIN OR SOB AT THIS TIME. PT IS NPO SINCE MIDNIGHT. WILL 
TALK TO MD ABOUT RESTARTING DIET.
RECEIVED REPORT FROM DAY SHIFT NO ACUTE DISTRESS NOTD
RECEIVED REPORT FROM DAY SHIFT RN. PT SEDATED 2+ SLUGGISH PUPILS, FLACCID MUSCLE TONE. 
PITOCIN, MORPHINE DRIP AND PROPOFOL DRIP CURRENTLY RUNNING THROUGH RIJ CENTRAL LINE. SINUS 
TACHYCARDIA 100S, ARTERIAL INVASIVE BLOOD PRESSURE 103/96, ART LINE ZEROED AND FLUSHED, +1 
PULSES UPPER AND LOWER EXTREMITIES, +2 GENERALIZED EDEMA NOTED. 7.5 ETT TO VENT 22 CM @ 
OUTER LIP. LUNG SOUNDS DIMINISHED SLIGHT EXPIRATORY WHEEZES NOTED. SCANT WHITE SPUTUM NOTED. 
ABD SOFT, BULKY DRESSING CDI, IN PLACE S/P . NGT TO R NARES +PLACEMENT. TIMMONS CATH 
IN PLACE, DARK ANASTASIA URINE; LOW URINE OUTPUT NOTED. R UPPER ARM / ANTECUBITAL SPACE SKIN 
DISCOLORATION NOTED. SCDS IN PLACE. ALL ALARMS VERIFIED. SAFETY PRECAUTIONS IN PLACE, BED IN 
LOWEST POSITION, LOCKED. WILL CONTINUE TO OBSERVE.
RECEIVED REPORT FROM [facility] NURSE VAIL AT BEDSIDE FOR CONTINUITY OF CARE.Lao 
SPEAKING. PT IV NOTED RIJ 3 LUMEN 1/2 NS 30ML/HR. NO SOB NO S/S OF DISTRESS. BED LOWERED 
CALL LIGHT WITHIN REACH WILL CONTINUE TO MONITOR.
RECEIVED REPORT FROM MORNING RN, FREDA, FOR CONTINUITY OF CARE. VS STABLE AT THIS TIME. 
AFEBRILE. PT DROWSY BUT ABLE TO MAKE NEEDS KNOWN AND TO FOLLOW COMMAND. PERRL. DENIES PAIN 
AT THIS TIME. LUNG SOUNDS CLEAR. PT ON OXYGEN AT 6L/MIN VIA MASK. NO SOB NOTED. NO SIGNS OF 
RESPIRATORY DISTRESS. BREATHING EVEN AND UNLABORED. SYMMETRICAL CHEST EXPANSION. RT AT 
BEDSIDE. PT TO BE SWITCHED TO NASAL CANNULA POST BREATHING TREATMENT. S1+S2 HEARD. SR ON 
MONITOR. PULSES PALPABLE IN ALL EXTREMITIES. PT NOTED WITH EDEMA THAT IS NON-PITTING +2 IN 
BUE/BLE. ABDOMEN ROUND, SOFT AND NONDISTENDED. BS ACTIVE IN ALL QUADRANTS. PT HAS LOWER 
ABDOMEN INCISION, S/P . INCISION IS COVERED WITH DRESSING WHICH IS INTACT, DRY, AND 
CURRENTLY NO DRAINAGE NOTED. TIMMONS CATHETER IN PLACE THAT IS DRAINING CLEAR AND YELLOW 
URINE. CURRENTLY HAS 50ML OUTPUT. PT HAS RIGHT IJ CENTRAL LINE AND LEFT IJ STANISLAV CATHETER. 
PT HAS A LARGE DISCOLORATION ON RIGHT UPPER ARM. SITE COVERED WITH KERLIX. PT HAS EDEMA 
NON-PITTING ON BUE. HOB KEPT AT 30 DEGREES. BED AT LOW POSSIBLE POSITION. CALL LIGHT WITHIN 
REACH. WILL CONTINUE TO MONITOR PT.
RECEIVED REPORT FROM NIGHT SHIFT RN. PT RESTING IN BED. SR ON MONITOR. FLUCTUATING BP HIGH 
TO NORMAL. AWAKE BUT CONFUSED. KNOW NAME AND STATES SHE IS IN HOSPITAL. SKIN DRY AND WARM TO 
TOUCH. LUNGS CLEAR ON AUSCULTATION. RIJ TRIPLE LUMEN CATHETER NOTED. HAVE GOOD BLOOD RETURN. 
D5%NS RUNNING AT 50 ML/HR. LEFT IJ STANISLAV CATH INTACT. EXTRAVASATION NOTED ON KENNETH, COVERED 
WITH DRESSING. ABDOMEN SOFT, LARGE AND TENDER. S/P C/S SURGICAL DRESSING NOTED ON LOWER 
ABDOMEN. DRESSING DRY AND INTACT. NO ACTIVE BLEEDING NOTED AT THE SITE. ACTIVE BOWEL SOUND. 
PITTING EDEMA NOTED ON BOTH UPPER AND LOWER EXTREMITIES. KEPT HOB ELEVATED. BED IN LOW 
POSITION LOCKED. WILL CONTINUE TO MONITOR.
RECEIVED REPORT FROM NIGHTSHIFT NURSE AT BEDSIDE. PATIENT PRESENTS IN SUPINE POSITION WITH 
PILLOWS ELEVATING BOTH ARMS. NO DISTRESS NOTED AT THIS TIME. NO PAIN NOTED. PATIENT HAS A 
RIGHT IJ TRIPLE LUMEN INFUSING LACTATED RINGERS AT 70 ML/HR. PATIENT HAS DRESSING ON RIGHT 
ARM AND ABDOMEN DRY AND INTACT. SAFETY MEASURES ENSURED. PATIENT COMMODE AT BEDSIDE. UPDATED 
BOARD IN PATIENT'S ROOM. WILL CONTINUE TO MONITOR PATIENT.
RECEIVED REPORT FROM OR RN FOR CONTINUITY OF CARE IN ICU. DR. GORMAN AND RESIDENT 
PHYSICIANS AT BEDSIDE, RT AT BEDSIDE. PATIENT IS SEDATED ON PROPOFOL DRIP, FLACC 0. ST ON 
MONITOR. PATIENT HAS A S/P INCISION TO LOWER ABDOMEN FROM CESARIAN. PATIENT HAD CENTRAL LINE 
AND ARTERIAL LINE INSERTED IN OR. ARTERIAL LINE IS PATENT, GOOD BLOOD RETURN NOTED. PATIENT 
HAS ETT TO VENT. BREATHING EVEN AND UNLABORED. HOB IS SEMI FOWLERS IN LOW POSITION. ALL 
SAFETY PRECAUTIONS IN PLACE AND ENFORCED. CALL LIGHT WITHIN REACH. WILL CONTINUE TO MONITOR.
RECEIVED REPORT FROM THE NIGHT SHIFT NURSE AT BEDSIDE FOR CONTINUITY OF CARE. PT IS ASLEEP. 
FAMILY AT BEDSIDE, ALSO SLEEPING. WILL BE BACK TO ASSESS PT.
REGITINE GIVEN PER POLICY AND PROCEDURE. R ARM ELEVATED ABOVE HEART, COLD COMPRESS APPLIED, 
DISPOSABLE PAD PLACED UNDERNEATH AFFECTED LIMB. DISTAL PULSES INTACT, <3 SEC CAP REFILL, 
SKIN SURROUNDING AFFECTED AREA, PINK, AND DRY. NO OTHER S/S OF ACUTE DISTRESS NOTED.
REMOVED ARTERIAL LINE IN LEFT UPPER EXTREMITY PER DR. SHERIF ROWLEY, APPLIED PRESSURE TO 
SITE AND COVERED WITH GAUZE AND TAPE.
REMOVED L STANISLAV CATH. VERY LITTLE BLEEDING. ADMINISTERED PRESSURE BANDAGE. ASKED PT TO PUT 
PRESSURE ON FOR AT LEAST 10 MIN.
REPORT GIVEN TO MORNING RN, IMANI, FOR CONTINUITY OF CARE. VS STABLE AT THIS TIME.
RESIDENT MD GARVIN MADE AWARE OF PT. HAVING LIQUIDY STOOL. NO ORDERS GIVEN. "I WILL LET 
THE MORNING DOCTOR KNOW ABOUT IT". PT. SLEEPING AT THIS TIME. CALL LIGHT WITH IN REACH.
RESTING IN BED.  AT BEDSIDE. VS WNL.
RIGHT ARM WOUND DRESSING CHANGED. PATIENT TOLERATED WEL
RIGHT UPPER ARM EXTRAVASATION SITE SKIN IN BLACK COLOR, ERYTHEMA AND +1 EDEMA. PLAN 
DISCUSSED WITH DR. PIERRE, WILL HAVE US AND SURGEON TO CONSULT PER DR. PIERRE. PRIMARY RN AND PT. 
NOTIFY.
RT @ BEDSIDE
Received pt stable on vent support at documented settings, suctioned scant amounts of thin 
clear secretions, hhn tx given, tolerated well, no resp distress or SOB noted at this time, 
7.5 ETT secured and patent, alarms set and audible, ambu bag at bedside, vent plugged into 
red outlet, pulse ox on, will cont to monitor.
SEDATED BREATH SOUNDS CLEAR BILATERAL WITH GOOD CHEST RISE AND AERATION THROUGHOUT PULMONARY 
GRAVES VENTILATOR CHANGE: RATE 24 FIO2 100% AS NOTED AT 1055
SEDATED DEEP TRACHEAL SUCTION FOR SMALL THIN YELLOW SECRETIONS AIRWAY PATENT
SEEN BY DR. BANERJEE AND RESIDENT GROUP. MADE AWARE ABOUT LOW POTASSIUM 3.2.
SEEN PT ASLEEP BUT EASILY AROUSABLE. VITAL SIGNS CHECKED. PT DENIES ANY PAIN AT THIS TIME. 
SAFETY REINFORCED.
SEEN PT AWAKE, ALERT AND ORIENTED, SPEAKS Frisian ONLY. SPOUSE AND SON AT BEDSIDE. INITIAL 
ASSESSMENT DONE. PT RT ARM ELEVATED OVER PILLOW AND PT SAYING IT NEEDS TO BE CHANGED. 
INFORMED PT THAT IT WILL BE CHANGE TONIGHT. VITAL SIGNS CHECKED. PT COMPLAINING OF PAIN ON 
THE RT ARM. WILL MEDICATE FOR PAIN AS ORDERED. SAFETY REINFORCED. CALL LIGHT W/IN REACH.
SEEN PT HAVING HER BREATHING TREATMENT. PT COMPLAINING OF PAIN AS WELL. PT MEDICATED W/ 
MORPHINE AS ORDERED. TEACHINGS PROVIDED. PT WANTS HER LOWER ABDOMINAL DRESSING CHANGE. 
STAPLES INTACT W/ MINIMAL DRAINAGE. DRESSING CHANGED. PT KEPT COMFORTABLE. WILL CONTINUE TO 
MONITOR.
SEEN PT SITTING ON THE CHAIR. ENCOURAGED PT TO USE THE INCENTIVE SPIROMETER 10X IN AN HR 
WHILE AWAKE AND TO MOVE AND AMBULATE MORE. PT VERBALIZED UNDERSTANDING. SAFETY ENSURED.
SINDY AND RECEIVED CALL FROM DR. HUSSEIN. UPDATED RECENT LABS. JORGE A DERAS. WILL FOLLOW UP 
ORDER. 

-------------------------------------------------------------------------------

Addendum: 09/22/18 at 1752 by Alma Ahn RN

-------------------------------------------------------------------------------

WRONG PT CHARTING.
SLEEPING AT THIS TIME. WAKES UP EASILY WHEN TOUCHED . TELEMETRY MONITORING. CALL LIGHT WITH 
IN REACH.
SLEEPING IN BED AT THIS TIME. NO ACUTE RESPIRATORY DISTRESS NOTED. VS WNL. WILL CONTINUE TO 
MONITOR.
SLEEPING. CALL LIGHT WITH IN REACH. 3 PORTS RIJ FLUSHED WITH NS. NO RESTLESSNESS.
SLEPT WELL THIS SHIFT. NO COMPLAINTS DONE. ASSISTED WITH ADLS. ABLE TO VERBALIZE NEEDS WELL. 
PT. ABLE TO GO RESTROOM INSIDE ROOM. ROM X 4. CLEAR SPEECH.
STAFF RN MELANIE AS AN  ALL WOUND CONDITIONS AND  TREATMENT PLANS EXPLAINED TO PT. 
AND , ALL QUESTIONS ANSWERED.

SPOKE TO RD FOR LOW ALBUMIN, RD WILL RE-ASSESS.

-------------------------------------------------------------------------------

Addendum: 09/21/18 at 1200 by HARSH Kta RN (Grace)

-------------------------------------------------------------------------------

PT. AND  VERBALIZES UNDERSTANDING CONVERSATION AND TREATMENT PLANS AT THIS TIME.
STANISLAV CATHETER PROCEDURE COMPLETE, PATIENT TOLERATED WELL. ALL SAFETY PRECAUTIONS IN 
PLACE. NO SIGNS OF DISTRESS NOTED. WILL CONTINUE TO MONITOR
STUDENT RN AND INSTRUCTOR, ADMINISTERED MORNING MEDS. PT TOLERATED WELL. I GAVE THE IVP MED. 
NO SIGNS OF DISTRESS. NO COMPLAINS. WILL CONTINUE TO MONITOR PT.
TIME OUT FOR STANISLAV CATHETER PLACEMENT IN LEFT IJ PERFORMED.
TIMMONS CATHETER DISCONTINUED. 850ML OF CLEAR YELLOW URINE OUTPUT NOTED
TIMMONS CATHETER STILL DRAINING CLEAR AND YELLOW URINE. ORAL CARE PROVIDED TO PT. TURNED AND 
REPOSITIONED. CHECKED BUTTOCKS AND SACRAL AREA, NO REDNESS NOTED.
TOOK PICTURES OF WOUNDS WITH WOUND CARE NURSE PERFORMING DRESSING CHANGE. WILL CONTINUE TO 
MONITOR PATIENT.
US TECH AT BEDSIDE, NO SIGNS OF DISTRESS AT THIS TIME. WILL CONTINUE TO MONITOR
USED TRANSLATING SERVICE ARNULFO [id number] FOR DISCHARGE INSTRUCTIONS. PATIENT VERBALIZED 
UNDERSTANDING. REMOVED CENTRAL LINE FROM PATIENT.
V/S WITHIN NORMAL RANGE. PT IS AOX4, SPEAKS VERY LITTLE ENGLISH. PT HAS AN R IJ INFUSING 1/2 
NS AT 30ML. AND L IJ/[place]. SHE HAS A DRESSING ON THE R UPPER ARM. PER NIGHT SHIFT 
NURSE, IT IS FROM THE DOPAMINE INFILTRATION, DURING HER CODE. PT ALSO HAS DRESSING ON THE 
LOWER ABD FROM C-SEC. IT IS DRY AND INTACT. WILL TAKE PICTURE OF IT LATER BEFORE D/C. PLAN 
FOR TODAY: D/C HOME. WILL CONTINUE TO MONITOR PT.
VAP ORAL CARE DONE PER PROTOCOL. PT TURNED AND REPOSITIONED. WILL CONTINUE TO OBSERVE.
VAP ORAL CARE DONE, PT TURNED AND REPOSITINED
VAP ORAL CARE DONE, PT TURNED AND REPOSITIONE
VAP ORAL CARE DONE, PT TURNED AND REPOSITIONED
VAP ORAL CARE DONE, PT TURNED AND REPOSITIONED.
VENT CHECK DONE. PT HAS NO SECRETIONS FOR SAMPLE. NO SOB OR DISTRESS NOTED. WILL CONTINUE TO 
MONITOR.
VENTILATOR REMAINS AT BEDSIDE AS STAND BY
VISIT BY SISTER SHANNON ROBERTS AND HER   [phone]. AT BED SIDE.
VITAL SIGNS ARE WITHIN NORMAL LIMITS. REPOSITION PATIENT TOLERATED WELL. NO SOB. CALL LIGHT 
IS WITHIN REACH.
VS STABLE. AFEBRILE. SINUS RHYTHM TO SINUS TACHYCARDIA ON MONITOR. PT DENIES PAIN AT THIS 
TIME. HOB AT 30 DEGREES. ALL SAFETY PRECAUTIONS ARE IN PLACE.
VS WITHIN NORMAL RANGE. CHANGED OUTER DRESSING ONLY. DID NOT REMOVE PACKING. IT HAS BEEN 
LESS THAN 24 HRS SINCE SURGERY. WILL ENDORSE TO NIGHT SHIFT TO ENDORSE TO TOMORROW DAY SHIFT 
RN. WAIT FOR DORITA KATE TO DO IT BEFORE 

D/C.
WHEELED PT OUT ON BED FOR OR.
WILL ENDORSE CARE TO DAYSHIFT NURSE.
WOUND CARE EVALUATION NOTE: 

REASON FOR EVALUATION: SURGICAL WOUND AND RIGHT UPPER ARM WOUND

SKIN ASSESSMENT DONE WITH THIS 30 Y/O FEMALE TRANSFER FROM OB TO ICU S/P CARDIAC ARREST AND 
ACUTE RENAL FAILURE. PAST MEDICAL HX METH. USER, ALL ABOVE INFORMATION OBTAINED FROM CHART 
REVIEW. PT.LABS ARE WBC 12.7, H/H 9.7/28.7, [id number] AND ALBUMIN 1.5.

PT IS ALERT, SKIN IS WARM WITH GOOD HYDRATION, BLE NO HAIR GROWTH, NO  EDEMA. PLAN OF CARE 
DISCUSS WITH PRIMARY RN, PT. AND GABBY XIONG.



INTEGUMENTARY:

-CENTRAL LINE RIGHT IJ DRESSING CDI

-STANISLAV CATH TO LEFT IJ DRESSING CDI

-LOWER ABDOMEN S/P  SURGICAL WOUNDS, WOUND SITE CLEAN AND DRY WITH MULTIPLE STAPLES 
IN PLACE, NO S/S OF WOUND DEHISCENCE

-EXTRAVASATION TO RIGHT UPPER ARM, ERYTHEMA [id number] WITH ACTIVE OPEN WOUND PARTIAL THICKNESS 
SKIN LOSS 4X3X0.1CM, MONTSE-WOUND SLIGHTLY EDEMA AND WARM TO TOUCH



RECOMMENDATION:

-CLEANSE LOWER ABDOMEN S/P  SURGICAL WOUNDS WITH NS. PAT DRY, APPLY DRY DRESSING QD 
AND PRN ID SOILING.

-CLEANSE RIGHT UPPER ARM WOUND WITH NS. PAT DRY , APPLY HYDROGEL AND ADAPTIC DRESSING ON 
ACTIVE OPEN WOUND AREA AND APPLY ADAPTIC DRESSING TO ERYTHEMA AREA  AND COVER WITH KERLIX 
ROLLS CHANGE PRN IF SOILING

-INSTRUCT PT. TO HOLD ON ABDOMEN SURGICAL WOUND SITE  WHEN COUGH OR CHANGE POSITION. ASSIST 
PT. IN TURNING AND REPOSITION IF NEEDED.



ALL RECOMMENDATION DISCUSS WITH PRIMARY RN
WOUND CARE EVALUATION PENDING AT THIS TIME,  < 24 HOURS
WOUND CARE RE-EVALUATION NOTE: S/P DEBRIDEMENT RIGHT UPPER ARM EXTRAVASATION SITE



-SURGICAL WOUND S/P DEBRIDEMENT RIGHT UPPER ARM EXTRAVASATION SITE, [id number] WOUND BED IS 
CLEAN, 100 % RED, MUSCLE OBSERVED, MOIST, NO DRAINAGE, NO ODOR, WITH IRREGULAR WOUND SHAPE. 
MONTSE-WOUND SKIN INTACT. PAIN 2/10

-LOWER ABDOMEN S/P  SURGICAL WOUNDS, WOUND SITE CLEAN AND DRY WITH MULTIPLE STAPLES 
IN PLACE, NO S/S OF WOUND DEHISCENCE



RECOMMENDATIONS:

-CLEANSE LOWER ABDOMEN S/P  SURGICAL WOUNDS WITH NS. PAT DRY, APPLY DRY DRESSING QD 
AND PRN IF SOILING.

-CLEANSE RIGHT UPPER ARM SURGICAL WOUND WITH NS. PAT DRY, APPLY SILVASORB GEL AND ADAPTIC 
DRESSING AND COVER WITH DRY DRESSING AND KERLIX ROLLS CHANGE EVERY OTHER DAY AND PRN IF 
SOILING

-INSTRUCT PT. TO HOLD ON ABDOMEN SURGICAL WOUND SITE WHEN COUGH OR CHANGE OF POSITION. 

-PLEASE FOLLOW UP SURGEON 7-10 DAYS AFTER DISCHARGED.



WOUND CARE TEACHING STEP BY STEP DONE WITH DEMONSTRATION, WITH PRIMARY RN AT BED SIDE. PT. 
EXPRESS SHE UNDERSTAND BUT WITH THE CONCERN UNABLE TO PERFORM WOUND CARE BY HERSELF, AND 
 IS NOT AVAILABLE ALL THE TIMES. ALL ABOVE INFORMATION INCLUDING WOUND CARE TEACHING 
WAS TRANSLATED BY #[id number] SHADIA.



CASE MANAGEMENT MANAGER AND DR. PIERRE NOTIFY OF WOUND CARE RECOMMENDATIONS AND PTS CONCERN. 
PRIMARY RN ALSO INFORMED TO CONTINUE TO CONTACT FAMILY MEMBER AND CONTINUE WOUND CARE 
TEACHING. PRIMARY RN VERBALIZES UNDERSTANDING.
WOUND CARE RN AT BEDSIDE TO EXAMINE S/P INCISION. STATED THAT BECAUSE PROCEDURE WAS DONE 
TODAY, SHE WILL NOT EXAMINE WOUND. DR. GABBY ROJAS.
WOUND CARE RN, HUMBLE AT BEDSIDE TO ASSESS PATIENT, PLACE DRESSING ON EXTRAVASATION TO UPPER 
RIGHT ARM AND DRESSING TO LOWER ABDOMEN, PATIENT TOLERATED WELL.
XRAY TECHS AT BEDSIDE FOR CHEST XRAY, WILL CONTINUE TO MONITOR PATIENT
informed to Dr KIARA Westbrook that Dr Carreon (resident) does not take the patient, and told Dr KIARA Westbrook 
that ICU intensivist is on the patient's consult list already.    Dr KIARA Westbrook stated "OK"

-------------------------------------------------------------------------------

Addendum: 09/21/18 at 0723 by Erica Urena RN

-------------------------------------------------------------------------------

Dr Carreon will consulting with Dr Laughlin regarding the patient
received call from Dr Westbrook to transfer patient to ICU;  talked to Dr Carreon (resident) 
regarding transfer patient to ICU.   Dr Carreon (resident) stated that Dr Carreon (resident) will 
take care of it
Please make an appointment to follow up with Dr. Ramirez in 10-14 days.

## 2023-10-15 NOTE — NUR
REASON FOR EVALUATION: SURGICAL WOUND AND RIGHT UPPER ARM WOUND

SKIN ASSESSMENT DONE WITH THIS 30 Y/O FEMALE ADMITTED FROM HOME TO [facility] WITH INITIAL DX OF 
RIGHT ARM PAIN. PT. VISITED ED ON 2018 AND DISCHARGED HOME. PT. LABS ARE WBC 14.1, H/H 
9.1/28.1, GLUCOSE 162 AND ALBUMIN 2.2. WOUND CULTURE PENDING.

PT IS ALERT, SKIN IS WARM WITH GOOD HYDRATION, BLE NO HAIR GROWTH, NO EDEMA. PLAN OF CARE 
DISCUSS WITH PRIMARY RN AND PT.



INTEGUMENTARY:

-SURGICAL WOUND S/P DEBRIDEMENT RIGHT UPPER ARM EXTRAVASATION SITE, 46K31F3.5CM WOUND BED IS 
MOIST, WOUND BED COLOR MIXED OF YELLOW AND BROWN TISSUE, 5% RED COLOR MUSCLE OBSERVED, NO 
DRAINAGE, NO ODOR, WITH IRREGULAR WOUND SHAPE. MONTSE-WOUND SKIN INTACT. PAIN 0/10

-LOWER ABDOMEN S/P  SURGICAL WOUND, WOUND SITE CLEAN AND DRY WITH STAPLES IN PLACE, 
NO S/S OF WOUND DEHISCENCE



RECOMMENDATIONS:

-SURGEON TO CONSULT

-MAY REMOVE STAPLES TO LOWER ABDOMEN. DR. DUEÑAS NOTIFIED

-CLEANSE RIGHT UPPER ARM SURGICAL WOUND WITH NS. PAT DRY, APPLY THERAHONEY DRESSING SHEET 
AND COVER WITH DRY DRESSING AND KERLIX ROLLS, SECURE WITH TAPE, CHANGE EVERY OTHER DAY AND 
PRN IF SOILING

-PLEASE FOLLOW UP SURGEON 7-10 DAYS AFTER DISCHARGED.

-Dr. DUEÑAS NOTIFY AND WILL ARRANGE DISCHARGE PLANNING FOR WOUND CARE



RECOMMENDATIONS DISCUSSED WITH PRIMARY RN 

WILL FOLLOW UP PATIENT Q 7 -10 DAYS AND PRN.  PLEASE CONTACT WOUND CARE NURSE FOR ANY 
CONCERNS AND CHANGES IN WOUND CONDITION show